# Patient Record
Sex: MALE | Race: BLACK OR AFRICAN AMERICAN | Employment: OTHER | URBAN - METROPOLITAN AREA
[De-identification: names, ages, dates, MRNs, and addresses within clinical notes are randomized per-mention and may not be internally consistent; named-entity substitution may affect disease eponyms.]

---

## 2017-07-14 ENCOUNTER — HOSPITAL ENCOUNTER (OUTPATIENT)
Dept: MRI IMAGING | Age: 72
Discharge: HOME | End: 2017-07-14
Attending: UROLOGY
Payer: MEDICARE

## 2017-07-14 DIAGNOSIS — N28.9 RENAL DISEASE: ICD-10-CM

## 2017-07-14 LAB — CREAT BLD-MCNC: 2 MG/DL (ref 0.6–1.3)

## 2017-07-14 PROCEDURE — 74183 MRI ABD W/O CNTR FLWD CNTR: CPT

## 2017-07-14 PROCEDURE — 74011250636 HC RX REV CODE- 250/636: Performed by: UROLOGY

## 2017-07-14 PROCEDURE — 82565 ASSAY OF CREATININE: CPT

## 2017-07-14 PROCEDURE — A9577 INJ MULTIHANCE: HCPCS | Performed by: UROLOGY

## 2017-07-14 RX ADMIN — GADOBENATE DIMEGLUMINE 20 ML: 529 INJECTION, SOLUTION INTRAVENOUS at 11:30

## 2018-07-16 ENCOUNTER — HOSPITAL ENCOUNTER (OUTPATIENT)
Dept: MRI IMAGING | Age: 73
Discharge: HOME | End: 2018-07-16
Attending: UROLOGY
Payer: MEDICARE

## 2018-07-16 DIAGNOSIS — N28.89 RENAL MASS: ICD-10-CM

## 2018-07-16 LAB — CREAT BLD-MCNC: 1.8 MG/DL (ref 0.6–1.3)

## 2018-07-16 PROCEDURE — 74011250636 HC RX REV CODE- 250/636: Performed by: UROLOGY

## 2018-07-16 PROCEDURE — 74183 MRI ABD W/O CNTR FLWD CNTR: CPT

## 2018-07-16 PROCEDURE — 82565 ASSAY OF CREATININE: CPT

## 2018-07-16 PROCEDURE — A9585 GADOBUTROL INJECTION: HCPCS | Performed by: UROLOGY

## 2018-07-16 RX ADMIN — GADOBUTROL 10 ML: 604.72 INJECTION INTRAVENOUS at 10:16

## 2022-04-18 ENCOUNTER — HOSPITAL ENCOUNTER (OUTPATIENT)
Dept: GENERAL RADIOLOGY | Age: 77
Discharge: HOME | End: 2022-04-18
Payer: MEDICARE

## 2022-04-18 ENCOUNTER — TRANSCRIBE ORDER (OUTPATIENT)
Dept: REGISTRATION | Age: 77
End: 2022-04-18

## 2022-04-18 DIAGNOSIS — M54.2 CERVICAL PAIN: Primary | ICD-10-CM

## 2022-04-18 DIAGNOSIS — M54.2 CERVICAL PAIN: ICD-10-CM

## 2022-04-18 PROCEDURE — 72050 X-RAY EXAM NECK SPINE 4/5VWS: CPT

## 2022-04-18 PROCEDURE — 72052 X-RAY EXAM NECK SPINE 6/>VWS: CPT

## 2022-04-25 ENCOUNTER — TRANSCRIBE ORDER (OUTPATIENT)
Dept: SCHEDULING | Age: 77
End: 2022-04-25

## 2022-04-25 DIAGNOSIS — M54.12 BRACHIAL NEURITIS: Primary | ICD-10-CM

## 2022-06-02 ENCOUNTER — OFFICE VISIT (OUTPATIENT)
Dept: ORTHOPEDIC SURGERY | Age: 77
End: 2022-06-02
Payer: MEDICARE

## 2022-06-02 VITALS — BODY MASS INDEX: 30.2 KG/M2 | WEIGHT: 223 LBS | HEIGHT: 72 IN

## 2022-06-02 DIAGNOSIS — M25.562 CHRONIC PAIN OF LEFT KNEE: Primary | ICD-10-CM

## 2022-06-02 DIAGNOSIS — M25.462 EFFUSION, LEFT KNEE: ICD-10-CM

## 2022-06-02 DIAGNOSIS — G89.29 CHRONIC PAIN OF LEFT KNEE: Primary | ICD-10-CM

## 2022-06-02 DIAGNOSIS — M17.11 PRIMARY OSTEOARTHRITIS OF RIGHT KNEE: ICD-10-CM

## 2022-06-02 PROCEDURE — 99213 OFFICE O/P EST LOW 20 MIN: CPT | Performed by: ORTHOPAEDIC SURGERY

## 2022-06-02 PROCEDURE — 3017F COLORECTAL CA SCREEN DOC REV: CPT | Performed by: ORTHOPAEDIC SURGERY

## 2022-06-02 PROCEDURE — 20610 DRAIN/INJ JOINT/BURSA W/O US: CPT | Performed by: ORTHOPAEDIC SURGERY

## 2022-06-02 PROCEDURE — G8427 DOCREV CUR MEDS BY ELIG CLIN: HCPCS | Performed by: ORTHOPAEDIC SURGERY

## 2022-06-02 PROCEDURE — 1123F ACP DISCUSS/DSCN MKR DOCD: CPT | Performed by: ORTHOPAEDIC SURGERY

## 2022-06-02 PROCEDURE — G8432 DEP SCR NOT DOC, RNG: HCPCS | Performed by: ORTHOPAEDIC SURGERY

## 2022-06-02 PROCEDURE — G8756 NO BP MEASURE DOC: HCPCS | Performed by: ORTHOPAEDIC SURGERY

## 2022-06-02 PROCEDURE — G8536 NO DOC ELDER MAL SCRN: HCPCS | Performed by: ORTHOPAEDIC SURGERY

## 2022-06-02 PROCEDURE — 1101F PT FALLS ASSESS-DOCD LE1/YR: CPT | Performed by: ORTHOPAEDIC SURGERY

## 2022-06-02 PROCEDURE — G8419 CALC BMI OUT NRM PARAM NOF/U: HCPCS | Performed by: ORTHOPAEDIC SURGERY

## 2022-06-02 RX ORDER — TRIAMCINOLONE ACETONIDE 40 MG/ML
40 INJECTION, SUSPENSION INTRA-ARTICULAR; INTRAMUSCULAR ONCE
Status: COMPLETED | OUTPATIENT
Start: 2022-06-02 | End: 2022-06-02

## 2022-06-02 RX ORDER — LIDOCAINE HYDROCHLORIDE 10 MG/ML
2 INJECTION INFILTRATION; PERINEURAL ONCE
Status: COMPLETED | OUTPATIENT
Start: 2022-06-02 | End: 2022-06-02

## 2022-06-02 RX ADMIN — LIDOCAINE HYDROCHLORIDE 2 ML: 10 INJECTION INFILTRATION; PERINEURAL at 10:32

## 2022-06-02 RX ADMIN — TRIAMCINOLONE ACETONIDE 40 MG: 40 INJECTION, SUSPENSION INTRA-ARTICULAR; INTRAMUSCULAR at 10:32

## 2022-06-02 NOTE — PROGRESS NOTES
Maribel Devlin (: 1945) is a 78 y.o. male, patient, here for evaluation of the following chief complaint(s):  Knee Pain (left knee swelling and pain for month, worsening in the last week, no injury or fall reported )       HPI:    Maribel Devlin (: 1945) is a 78 y.o. male, patient, here for evaluation of the following chief complaint(s):  Knee Pain (left knee swelling and pain for month, worsening in the last week, no injury or fall reported )       HPI:    Left knee pain. Short duration. Just swelled up. No injury. Allergies   Allergen Reactions    Nsaids (Non-Steroidal Anti-Inflammatory Drug) Other (comments)     Constipation         Current Outpatient Medications   Medication Sig    hydrALAZINE (APRESOLINE) 50 mg tablet Take 50 mg by mouth three (3) times daily.  tamsulosin (FLOMAX) 0.4 mg capsule Take 0.4 mg by mouth daily.  calcium-cholecalciferol, d3, 600-125 mg-unit tab Take  by mouth.  doxycycline (ADOXA) 100 mg tablet Take 100 mg by mouth two (2) times a day.  predniSONE (DELTASONE) 20 mg tablet Take  by mouth daily (with breakfast).  multivitamin (ONE A DAY) tablet Take 1 Tab by mouth daily.  Omeprazole delayed release (PRILOSEC D/R) 20 mg tablet Take 1 Tab by mouth daily.  diltiazem CD (CARDIZEM CD) 240 mg ER capsule Take 1 Cap by mouth daily.  pravastatin (PRAVACHOL) 40 mg tablet Take 1 Tab by mouth daily.  sildenafil citrate (VIAGRA) 100 mg tablet Take 1 Tab by mouth as needed.  omeprazole (PRILOSEC) 20 mg capsule     traZODone (DESYREL) 50 mg tablet Take  by mouth nightly.  sertraline (ZOLOFT) 100 mg tablet Take  by mouth daily.  aspirin 81 mg Tab Take  by mouth daily. No current facility-administered medications for this visit.        Past Medical History:   Diagnosis Date    Chronic kidney disease     Dyspepsia and other specified disorders of function of stomach     Hypercholesterolemia     Hypertension     Ill-defined condition     enlarged prostate    Other ill-defined conditions(799.89)     high cholest.    Psychiatric disorder     PTSD        Past Surgical History:   Procedure Laterality Date    HX CATARACT REMOVAL  2006    HX HERNIA REPAIR      HX ORTHOPAEDIC  2014    elbow surgery       Family History   Problem Relation Age of Onset    Cancer Mother     Cancer Sister         Social History     Socioeconomic History    Marital status:      Spouse name: Not on file    Number of children: Not on file    Years of education: Not on file    Highest education level: Not on file   Occupational History    Not on file   Tobacco Use    Smoking status: Former Smoker     Quit date: 1995     Years since quittin.4    Smokeless tobacco: Never Used   Substance and Sexual Activity    Alcohol use: Yes     Comment: rarely    Drug use: Not on file    Sexual activity: Not on file   Other Topics Concern    Not on file   Social History Narrative    Not on file     Social Determinants of Health     Financial Resource Strain:     Difficulty of Paying Living Expenses: Not on file   Food Insecurity:     Worried About [address] in the Last Year: Not on file    [address] in the Last Year: Not on file   Transportation Needs:     Lack of Transportation (Medical): Not on file    Lack of Transportation (Non-Medical):  Not on file   Physical Activity:     Days of Exercise per Week: Not on file    Minutes of Exercise per Session: Not on file   Stress:     Feeling of Stress : Not on file   Social Connections:     Frequency of Communication with Friends and Family: Not on file    Frequency of Social Gatherings with Friends and Family: Not on file    Attends [employer] Services: Not on file    Active Member of Clubs or Organizations: Not on file    Attends Club or Organization Meetings: Not on file    Marital Status: Not on file   Intimate Partner Violence:     Fear of Current or Ex-Partner: Not on file    Emotionally Abused: Not on file    Physically Abused: Not on file    Sexually Abused: Not on file   Housing Stability:     Unable to Pay for Housing in the Last Year: Not on file    Number of [place] in the Last Year: Not on file    Unstable Housing in the Last Year: Not on file       ROS     Positive for: Musculoskeletal    Last edited by Josselin Lester on 6/2/2022 10:05 AM. (History)            Vitals:  Ht 6' (1.829 m)   Wt 223 lb (101.2 kg)   BMI 30.24 kg/m²    Body mass index is 30.24 kg/m². PHYSICAL EXAM:  On exam today he has a large swollen left knee. Left hip has painless range of motion. Extensor mechanism is intact. IMAGING:  XR Results (most recent):  Results from Appointment encounter on 06/02/22    XR KNEE LT 3 V    Narrative  3 x-ray views of the left knee reveal well-maintained joint space on all 3 views with a large effusion. Her right knee has bone-on-bone medial compartment. ASSESSMENT/PLAN:  1. Chronic pain of left knee  -     XR KNEE LT 3 V; Future  -     triamcinolone acetonide (KENALOG-40) 40 mg/mL injection 40 mg; 40 mg, Intra artICUlar, ONCE, 1 dose, On Thu 6/2/22 at 1100  -     lidocaine (XYLOCAINE) 10 mg/mL (1 %) injection 2 mL; 2 mL, Intra artICUlar, ONCE, 1 dose, On Thu 6/2/22 at 1100  2. Effusion, left knee  -     triamcinolone acetonide (KENALOG-40) 40 mg/mL injection 40 mg; 40 mg, Intra artICUlar, ONCE, 1 dose, On Thu 6/2/22 at 1100  -     lidocaine (XYLOCAINE) 10 mg/mL (1 %) injection 2 mL; 2 mL, Intra artICUlar, ONCE, 1 dose, On Thu 6/2/22 at 1100  3. Primary osteoarthritis of right knee    Aspirated then injected his left knee. Do not know the absolute cause of his effusion. Hopefully will resolve. Definitely not indicated for any surgical intervention on the left knee. Discussed risks/benefits of cortisone injection and patient gave verbal consent.   Under sterile conditions, the left knee was injected with  2cc 1% Lidocaine and 1 cc 40 mg/cc Kenalog intra-articularly, tolerated the procedure well. An electronic signature was used to authenticate this note.   --Bee Olivia MD

## 2022-08-11 ENCOUNTER — OFFICE VISIT (OUTPATIENT)
Dept: ORTHOPEDIC SURGERY | Age: 77
End: 2022-08-11
Payer: MEDICARE

## 2022-08-11 VITALS — BODY MASS INDEX: 30.2 KG/M2 | WEIGHT: 223 LBS | HEIGHT: 72 IN

## 2022-08-11 DIAGNOSIS — M67.362 TRANSIENT SYNOVITIS, LEFT KNEE: Primary | ICD-10-CM

## 2022-08-11 DIAGNOSIS — M25.462 EFFUSION, LEFT KNEE: ICD-10-CM

## 2022-08-11 DIAGNOSIS — M25.562 LEFT KNEE PAIN, UNSPECIFIED CHRONICITY: ICD-10-CM

## 2022-08-11 PROCEDURE — 20610 DRAIN/INJ JOINT/BURSA W/O US: CPT | Performed by: ORTHOPAEDIC SURGERY

## 2022-08-11 RX ORDER — TRIAMCINOLONE ACETONIDE 40 MG/ML
40 INJECTION, SUSPENSION INTRA-ARTICULAR; INTRAMUSCULAR ONCE
Status: COMPLETED | OUTPATIENT
Start: 2022-08-11 | End: 2022-08-11

## 2022-08-11 RX ORDER — LIDOCAINE HYDROCHLORIDE 10 MG/ML
2 INJECTION INFILTRATION; PERINEURAL ONCE
Status: COMPLETED | OUTPATIENT
Start: 2022-08-11 | End: 2022-08-11

## 2022-08-11 RX ADMIN — LIDOCAINE HYDROCHLORIDE 2 ML: 10 INJECTION INFILTRATION; PERINEURAL at 09:18

## 2022-08-11 RX ADMIN — TRIAMCINOLONE ACETONIDE 40 MG: 40 INJECTION, SUSPENSION INTRA-ARTICULAR; INTRAMUSCULAR at 09:18

## 2022-08-11 NOTE — LETTER
8/11/2022    Patient: Indra Gamboa   YOB: 1945   Date of Visit: 8/11/2022     Alexis White MD  [address] [place] [address] [phone]  Via Fax: [phone]    Dear Alexis White MD,      Thank you for referring Mr. Chetan Gallegos to [facility] for evaluation. My notes for this consultation are attached. If you have questions, please do not hesitate to call me. I look forward to following your patient along with you.       Sincerely,    Fazal Moura MD Quality 431: Preventive Care And Screening: Unhealthy Alcohol Use - Screening: Patient screened for unhealthy alcohol use using a single question and scores less than 2 times per year Detail Level: Generalized Quality 130: Documentation Of Current Medications In The Medical Record: Current Medications Documented Quality 226: Preventive Care And Screening: Tobacco Use: Screening And Cessation Intervention: Patient screened for tobacco use and is an ex/non-smoker

## 2022-08-11 NOTE — PROGRESS NOTES
Jo Ann Wang (: 1945) is a 78 y.o. male, patient, here for evaluation of the following chief complaint(s):  Knee Pain (Left knee pain - injected with cortisone 22)       HPI:    Patient presents today large knee effusion. X-rays were not really diagnostic. Have history of having gout 1 time in the past.    Allergies   Allergen Reactions    Nsaids (Non-Steroidal Anti-Inflammatory Drug) Other (comments)     Constipation         Current Outpatient Medications   Medication Sig    hydrALAZINE (APRESOLINE) 50 mg tablet Take 50 mg by mouth three (3) times daily. tamsulosin (FLOMAX) 0.4 mg capsule Take 0.4 mg by mouth daily. calcium-cholecalciferol, d3, 600-125 mg-unit tab Take  by mouth. doxycycline (ADOXA) 100 mg tablet Take 100 mg by mouth two (2) times a day. predniSONE (DELTASONE) 20 mg tablet Take  by mouth daily (with breakfast). multivitamin (ONE A DAY) tablet Take 1 Tab by mouth daily. Omeprazole delayed release (PRILOSEC D/R) 20 mg tablet Take 1 Tab by mouth daily. diltiazem CD (CARDIZEM CD) 240 mg ER capsule Take 1 Cap by mouth daily. pravastatin (PRAVACHOL) 40 mg tablet Take 1 Tab by mouth daily. sildenafil citrate (VIAGRA) 100 mg tablet Take 1 Tab by mouth as needed. omeprazole (PRILOSEC) 20 mg capsule     traZODone (DESYREL) 50 mg tablet Take  by mouth nightly. sertraline (ZOLOFT) 100 mg tablet Take  by mouth daily. aspirin 81 mg Tab Take  by mouth daily. No current facility-administered medications for this visit.        Past Medical History:   Diagnosis Date    Chronic kidney disease     Dyspepsia and other specified disorders of function of stomach     Hypercholesterolemia     Hypertension     Ill-defined condition     enlarged prostate    Other ill-defined conditions(299.89)     high cholest.    Psychiatric disorder     PTSD        Past Surgical History:   Procedure Laterality Date    HX CATARACT REMOVAL      HX HERNIA REPAIR      HX ORTHOPAEDIC  2014    elbow surgery       Family History   Problem Relation Age of Onset    Cancer Mother     Cancer Sister         Social History     Socioeconomic History    Marital status:      Spouse name: Not on file    Number of children: Not on file    Years of education: Not on file    Highest education level: Not on file   Occupational History    Not on file   Tobacco Use    Smoking status: Former     Types: Cigarettes     Quit date: 1995     Years since quittin.6    Smokeless tobacco: Never   Substance and Sexual Activity    Alcohol use: Yes     Comment: rarely    Drug use: Not on file    Sexual activity: Not on file   Other Topics Concern    Not on file   Social History Narrative    Not on file     Social Determinants of Health     Financial Resource Strain: Not on file   Food Insecurity: Not on file   Transportation Needs: Not on file   Physical Activity: Not on file   Stress: Not on file   Social Connections: Not on file   Intimate Partner Violence: Not on file   Housing Stability: Not on file       ROS    Positive for: Musculoskeletal  Last edited by Neena Dietz on 2022  9:14 AM.            Khushi Martinez:  Ht 6' (1.829 m)   Wt 223 lb (101.2 kg)   BMI 30.24 kg/m²    Body mass index is 30.24 kg/m². PHYSICAL EXAM:  Exam today patient is left knee has large effusion. Neutral alignment. Really not that painful. IMAGING:  None    ASSESSMENT/PLAN:  1. Transient synovitis, left knee  -     triamcinolone acetonide (KENALOG-40) 40 mg/mL injection 40 mg; 40 mg, Intra artICUlar, ONCE, 1 dose, On Thu 22 at 1000  -     lidocaine (XYLOCAINE) 10 mg/mL (1 %) injection 2 mL; 2 mL, Intra artICUlar, ONCE, 1 dose, On Thu 22 at 1000  2.  Effusion, left knee  -     triamcinolone acetonide (KENALOG-40) 40 mg/mL injection 40 mg; 40 mg, Intra artICUlar, ONCE, 1 dose, On Thu 22 at 1000  -     lidocaine (XYLOCAINE) 10 mg/mL (1 %) injection 2 mL; 2 mL, Intra artICUlar, ONCE, 1 dose, On Thu 8/11/22 at 1000  3. Left knee pain, unspecified chronicity  -     triamcinolone acetonide (KENALOG-40) 40 mg/mL injection 40 mg; 40 mg, Intra artICUlar, ONCE, 1 dose, On Thu 8/11/22 at 1000  -     lidocaine (XYLOCAINE) 10 mg/mL (1 %) injection 2 mL; 2 mL, Intra artICUlar, ONCE, 1 dose, On Thu 8/11/22 at 1000  Aspirated left knee. Did send the fluid today for crystal analysis. Injected left knee. Patient's primary care doctor could help us with an inflammatory work-up or gout work-up on the patient. Discussed risks/benefits of cortisone injection and patient gave verbal consent. Under sterile conditions, the knee was injected with  2cc 1% Lidocaine and 1 cc 40 mg/cc Kenalog intra-articularly, tolerated the procedure well. An electronic signature was used to authenticate this note.   --Steve Santana MD

## 2022-08-12 DIAGNOSIS — M67.362 TRANSIENT SYNOVITIS, LEFT KNEE: Primary | ICD-10-CM

## 2022-08-13 LAB
BODY FLD TYPE: NORMAL
CRYSTALS FLD MICRO: NORMAL

## 2022-10-13 NOTE — PROGRESS NOTES
ASSESSMENT/PLAN:  Below is the assessment and plan developed based on review of pertinent history, physical exam, labs, studies, and medications. 1. Bilateral shoulder pain, unspecified chronicity  2. Neck pain      No follow-ups on file. In discussion with the patient, we considered the numerus possible diagnoses that could be contributing to their present symptoms. We also deliberated on the extensive management options that must be considered to treat their current condition. We reviewed their accessible prior medical records, diagnostic tests, and current health and employment information. We considered how these symptoms were affecting the patient´s activities of daily living as well as employment and fitness activities. The patient had various questions regarding the possible risks, benefits, complications, morbidity and mortality regarding their diagnosis and treatment options. The patients´ comorbidities were considered, and I advocated that they consider maximizing lifestyle modification through nutrition and exercise to aid in addressing their symptoms. Shared decision making yielded an understanding to move forward with conservation treatment preferences. The patient expressed understanding that if conservative management fails to alleviate the present symptoms they will return to office for re-evaluation and consideration of additional diagnostic tests and potential surgical options. In the interim, we have recommended ice, elevation, and take prescription anti-inflammatory medications along with a physician directed home exercise program. We discussed the risks and common side effects of anti-inflammatory medications and instructed the patient to discontinue the medication and contact us if they experienced any side effects. The patient was encouraged to discuss the possible side effects with their family physician or pharmacist prior to initiating any new medications.     We discussed the fact that many of the recommended treatment options presented are significantly limited by the patient´s social determinants of health. We also reviewed the circumstances surrounding the environment that they live and work which affect a wide range of health risk. We considered the limited access to appropriate educational resources regarding proper nutrition and exercise as well as the economic and social support necessary to maintain health and wellbeing. Given that the patient's symptoms are increasing in frequency and duration we have decided to prescribe physical therapy. We talked about the fact that the goal of physical therapy is for the therapist to assist in developing a program to help return the patient to full strength, function and mobility and decrease pain. We also discussed that the therapist may combine several techniques to help decrease pain. These include but are not limited to stretching, balance exercises, strength training, massage, cold and heat therapy, and electrical stimulation. Although, physical therapy is generally safe, we went over the potential risks to include the worsening of pre-existing conditions, continued pain and no improvement in flexibility, mobility, and strength. We will have the patient follow up after physical therapy to closely monitor their progress. We talked about following up sooner if therapy is not progressing on a weekly basis. After a long discussion regarding treatment options, we have decided to prescribe an oral medication. We discussed the risks and common side effects of the medication and instructed the patient to discontinue the medication and contact us if they experienced any side effects. We also encouraged the patient to discuss the possible side effects with their family physician or pharmacist prior to initiating any new medications.      Given that the patient's symptoms are increasing in frequency and duration, we have decided to evaluate the etiology of the pain and loss of function with an MRI. We discussed the risks of an MRI which include, but are not limited to the enclosed space, noisy environment, magnetic effect on implanted metal. We also talked about the fact that MRI is also contraindicated in the presence of internal metallic objects such as bullets or shrapnel, as well as surgical clips, pins, plates, screws, metal sutures, or wire mesh. We talked about the fact that MRI does not use radiation, but it may be contrindicated if the patient has implanted pacemakers, intracranial aneurysm clips, cochlear implants, certain prosthetic devices, implanted drug infusion pumps, neurostimulators, bone-growth stimulators, certain intrauterine contraceptive devices; or any other type of iron-based metal implants. We discussed the fact that if you are pregnant or suspect that you may be pregnant, you should notify your physician and consult with your primary care or obstetrician before having an MRI. Although rare, we talked about the fact that if contrast dye is used, there is a risk for allergic reaction to the dye. Patients who are allergic to or sensitive to medications, contrast dye, iodine, or shellfish should notify the radiologist or technologist prior to the administration of dye. MRI contrast may also influence other conditions such as allergies, asthma, anemia, hypotension (low blood pressure), and sickle cell disease. The patient has expressed understanding of these risks and I will see the patient back after the MRI to discuss the findings as well as the treatment options. We talked about the fact that we felt as though his biggest problem today was his cervical spine and possible radiculopathy to both shoulders. We will proceed with an MRI of his cervical spine as well as some start some physical therapy and a Medrol Dosepak.   We will refer him to see one of our spinal specialist.        SUBJECTIVE/OBJECTIVE:  Amrita Dominique (: 1945) is a 78 y.o. male, patient,here for evaluation of the No chief complaint on file. .   Patient presents for evaluation for neck and bilateral shoulder pain right worse than left. Patient states he has had intermittent pain over the years however it has worsened in the past couple weeks. He has been taking Tylenol with mild relief. He denies any specific injury or accidents. Denies any radiating symptoms down either arm. PHYSICAL EXAM:    Upon examination of the right shoulder, the patient sits with the scapula protracted and depressed. They are tender to palpation over the anterior supraspinatus and proximal biceps. There is mild palpable crepitus in the subacromial space with ranging. The patient has full range of motion, but discomfort with above shoulder range of motion. The patient has discomfort with impingement maneuvers and Whipple testing. The shoulder is stable on exam. They have 5/5 strength, and are neurovascularly intact distally. There is no erythema, warmth or skin lesions present. Upon examination of the left shoulder, the patient sits with the scapula protracted and depressed. They are tender to palpation over the anterior supraspinatus and proximal biceps. There is mild palpable crepitus in the subacromial space with ranging. The patient has full range of motion, but discomfort with above shoulder range of motion. The patient has discomfort with impingement maneuvers and Whipple testing. The shoulder is stable on exam. They have 5/5 strength, and are neurovascularly intact distally. There is no erythema, warmth or skin lesions present. IMAGING:    X-rays performed today in the office 6 views of bilateral shoulders demonstrate no obvious fractures or dislocations. X-rays performed today in the office 2 views of the cervical spine demonstrate significant degenerative changes predominantly at C3-4 and C6-7.     Allergies   Allergen Reactions    Nsaids (Non-Steroidal Anti-Inflammatory Drug) Other (comments)     Constipation         Current Outpatient Medications   Medication Sig    hydrALAZINE (APRESOLINE) 50 mg tablet Take 50 mg by mouth three (3) times daily. tamsulosin (FLOMAX) 0.4 mg capsule Take 0.4 mg by mouth daily. calcium-cholecalciferol, d3, 600-125 mg-unit tab Take  by mouth. doxycycline (ADOXA) 100 mg tablet Take 100 mg by mouth two (2) times a day. predniSONE (DELTASONE) 20 mg tablet Take  by mouth daily (with breakfast). multivitamin (ONE A DAY) tablet Take 1 Tab by mouth daily. Omeprazole delayed release (PRILOSEC D/R) 20 mg tablet Take 1 Tab by mouth daily. diltiazem CD (CARDIZEM CD) 240 mg ER capsule Take 1 Cap by mouth daily. pravastatin (PRAVACHOL) 40 mg tablet Take 1 Tab by mouth daily. sildenafil citrate (VIAGRA) 100 mg tablet Take 1 Tab by mouth as needed. omeprazole (PRILOSEC) 20 mg capsule     traZODone (DESYREL) 50 mg tablet Take  by mouth nightly. sertraline (ZOLOFT) 100 mg tablet Take  by mouth daily. aspirin 81 mg Tab Take  by mouth daily. No current facility-administered medications for this visit.        Past Medical History:   Diagnosis Date    Chronic kidney disease     Dyspepsia and other specified disorders of function of stomach     Hypercholesterolemia     Hypertension     Ill-defined condition     enlarged prostate    Other ill-defined conditions(799.89)     high cholest.    Psychiatric disorder     PTSD       Past Surgical History:   Procedure Laterality Date    HX CATARACT REMOVAL  2006    HX HERNIA REPAIR      HX ORTHOPAEDIC  2014    elbow surgery       Family History   Problem Relation Age of Onset    Cancer Mother     Cancer Sister        Social History     Socioeconomic History    Marital status:      Spouse name: Not on file    Number of children: Not on file    Years of education: Not on file    Highest education level: Not on file   Occupational History    Not on file   Tobacco Use Smoking status: Former     Types: Cigarettes     Quit date: 1995     Years since quittin.8    Smokeless tobacco: Never   Substance and Sexual Activity    Alcohol use: Yes     Comment: rarely    Drug use: Not on file    Sexual activity: Not on file   Other Topics Concern    Not on file   Social History Narrative    Not on file     Social Determinants of Health     Financial Resource Strain: Not on file   Food Insecurity: Not on file   Transportation Needs: Not on file   Physical Activity: Not on file   Stress: Not on file   Social Connections: Not on file   Intimate Partner Violence: Not on file   Housing Stability: Not on file       Review of Systems    No flowsheet data found. Vitals: There were no vitals taken for this visit. There is no height or weight on file to calculate BMI. An electronic signature was used to authenticate this note.   -- Wolfgang Duarte MD

## 2022-10-14 ENCOUNTER — OFFICE VISIT (OUTPATIENT)
Dept: ORTHOPEDIC SURGERY | Age: 77
End: 2022-10-14
Payer: MEDICARE

## 2022-10-14 VITALS — WEIGHT: 223 LBS | HEIGHT: 72 IN | BODY MASS INDEX: 30.2 KG/M2

## 2022-10-14 DIAGNOSIS — M54.12 CERVICAL RADICULOPATHY: ICD-10-CM

## 2022-10-14 DIAGNOSIS — M25.512 BILATERAL SHOULDER PAIN, UNSPECIFIED CHRONICITY: Primary | ICD-10-CM

## 2022-10-14 DIAGNOSIS — M54.2 NECK PAIN: ICD-10-CM

## 2022-10-14 DIAGNOSIS — M25.511 BILATERAL SHOULDER PAIN, UNSPECIFIED CHRONICITY: Primary | ICD-10-CM

## 2022-10-14 DIAGNOSIS — M50.30 DEGENERATIVE DISC DISEASE, CERVICAL: ICD-10-CM

## 2022-10-14 PROCEDURE — G8427 DOCREV CUR MEDS BY ELIG CLIN: HCPCS | Performed by: ORTHOPAEDIC SURGERY

## 2022-10-14 PROCEDURE — 3017F COLORECTAL CA SCREEN DOC REV: CPT | Performed by: ORTHOPAEDIC SURGERY

## 2022-10-14 PROCEDURE — G8536 NO DOC ELDER MAL SCRN: HCPCS | Performed by: ORTHOPAEDIC SURGERY

## 2022-10-14 PROCEDURE — 1101F PT FALLS ASSESS-DOCD LE1/YR: CPT | Performed by: ORTHOPAEDIC SURGERY

## 2022-10-14 PROCEDURE — 99214 OFFICE O/P EST MOD 30 MIN: CPT | Performed by: ORTHOPAEDIC SURGERY

## 2022-10-14 PROCEDURE — G8756 NO BP MEASURE DOC: HCPCS | Performed by: ORTHOPAEDIC SURGERY

## 2022-10-14 PROCEDURE — G8417 CALC BMI ABV UP PARAM F/U: HCPCS | Performed by: ORTHOPAEDIC SURGERY

## 2022-10-14 PROCEDURE — G8432 DEP SCR NOT DOC, RNG: HCPCS | Performed by: ORTHOPAEDIC SURGERY

## 2022-10-14 PROCEDURE — 1123F ACP DISCUSS/DSCN MKR DOCD: CPT | Performed by: ORTHOPAEDIC SURGERY

## 2022-10-14 RX ORDER — METHYLPREDNISOLONE 4 MG/1
4 TABLET ORAL
Qty: 1 DOSE PACK | Refills: 0 | Status: SHIPPED | OUTPATIENT
Start: 2022-10-14

## 2022-10-24 ENCOUNTER — OFFICE VISIT (OUTPATIENT)
Dept: ORTHOPEDIC SURGERY | Age: 77
End: 2022-10-24
Payer: MEDICARE

## 2022-10-24 DIAGNOSIS — M25.511 BILATERAL SHOULDER PAIN, UNSPECIFIED CHRONICITY: Primary | ICD-10-CM

## 2022-10-24 DIAGNOSIS — M54.12 CERVICAL RADICULOPATHY: ICD-10-CM

## 2022-10-24 DIAGNOSIS — M54.2 NECK PAIN: ICD-10-CM

## 2022-10-24 DIAGNOSIS — M25.512 BILATERAL SHOULDER PAIN, UNSPECIFIED CHRONICITY: Primary | ICD-10-CM

## 2022-10-24 PROCEDURE — 97161 PT EVAL LOW COMPLEX 20 MIN: CPT | Performed by: PHYSICAL THERAPIST

## 2022-10-24 PROCEDURE — 97140 MANUAL THERAPY 1/> REGIONS: CPT | Performed by: PHYSICAL THERAPIST

## 2022-10-24 NOTE — PROGRESS NOTES
CERVICAL SPINE EVALUATION  Patient Name: Zina Mcdaniel  WPTM:  : 1945  [x]  Patient  Verified  Payor: Tomi Mack / Plan: [address] [place] [address] HMO / Product Type: Managed Care Medicare /    Total Treatment Time (min): 40  Total Timed Codes (min): 40  1:1 Treatment Time ( [address] only): 40   Referring Physician:   Jose Mccracken MD       1. Bilateral shoulder pain, unspecified chronicity  2. Neck pain  3. Cervical radiculopathy      SUBJECTIVE  Patient is a 79-year-old male referred to physical therapy by Dr. Deana Issa for neck/shoulder pain. He reports symptoms began gradually about 2 months ago, has been staying the same since. He has had x-rays which show significant degeneration at C3-4 and C6-7. He is scheduled to have an MRI on . Pain located mainly along the right side of his neck into the right shoulder, occasionally on the left side. No radiating pain down the arms, numbness/tingling or weakness. Describes pain as sharp. He is taking Tylenol for relief, has not been using heat or ice. He denies any difficulty with ADLs. He is active with his [employer]. His goals for PT are to decrease his pain. Past Medical History:   Diagnosis Date    Chronic kidney disease     Dyspepsia and other specified disorders of function of stomach     Hypercholesterolemia     Hypertension     Ill-defined condition     enlarged prostate    Other ill-defined conditions(799.89)     high cholest.    Psychiatric disorder     PTSD        OBJECTIVE  Observations: Decreased cervical lordosis noted. He holds a forward head and rounded shoulder posture    Palpation: Tenderness to right cervical paraspinals, upper trap, levator    ROM: Able to flex chin to chest with some discomfort. Extension is limited 75% and painful. Left rotation limited to 62 degrees, right 52 degrees and painful. Bilateral side bend limited to 12 degrees with some discomfort.   Tightness with bilateral passive rotation, pain with endrange right rotation. There is also pain with right side bend. No restriction with AA rotation but some pain reported to the right. Forward flexion limited to 125 degrees, some pain on the right. Abduction 115 degrees with some pain. External rotation to T3 compared to T6 on the left. Internal rotation symmetrical to T12 with some tightness reported    Strength: Grossly 5/5 throughout bilateral upper extremities    Sensation: Intact    Joint mobility: Pain reported with PA mobilization to C5-C7    Special tests: Negative Spurling's. Relief reported with traction    Treatment:  Performed initial evaluation (20 minutes)    Manual therapy (15 minutes): Cervical traction with suboccipital release, soft tissue work to right cervical paraspinals, upper trap, rotation stretching, manual upper trap stretching     Therapeutic exercise (5 minutes): Provided and educated patient in home exercise program for cervical spine mobility and flexibility. We discussed patient goals and collaborated upon a plan of care        ASSESSMENT    Patient presents with impairments related to posture, cervical spine and shoulder range of motion, strength, and impaired ability to participate in desired activity second to primarily right-sided neck/shoulder pain. Presentation consistent with facet dysfunction/OA. He will benefit from outpatient physical therapy services to address above limitations and maximize function. Short-term Goals (1 week)  1. Patient will demonstrate independence with home exercise program to enhance recovery. Long-term Goals (4-6 weeks)  1. Patient will report at least 25% decrease in pain to maximize activity tolerance. 2. Patient will demonstrate full cervical rotation without increase in symptoms from baseline level. 3. Patient will demonstrate improved shoulder elevation without upper trap hiking or pain     Frequency: 1-2x per week. 20 visits.     Interventions to include but are not limited to joint mobilizations, myofascial release, soft tissue mobilization, therapeutic exercise, neuromuscular reeducation, dry needling, taping, and modalities as indicated. Jigna Mike, PT 10/24/2022  9:40 AM    The referring physician has reviewed and approved this evaluation and plan of care as noted by the electronic signature attached to note.

## 2022-12-27 ENCOUNTER — OFFICE VISIT (OUTPATIENT)
Dept: ORTHOPEDIC SURGERY | Age: 77
End: 2022-12-27
Payer: MEDICARE

## 2022-12-27 VITALS — WEIGHT: 218 LBS | BODY MASS INDEX: 29.53 KG/M2 | HEIGHT: 72 IN

## 2022-12-27 DIAGNOSIS — M25.462 EFFUSION, LEFT KNEE: Primary | ICD-10-CM

## 2022-12-27 DIAGNOSIS — M17.12 PRIMARY OSTEOARTHRITIS OF LEFT KNEE: ICD-10-CM

## 2022-12-27 PROCEDURE — 20610 DRAIN/INJ JOINT/BURSA W/O US: CPT | Performed by: ORTHOPAEDIC SURGERY

## 2022-12-27 PROCEDURE — 99024 POSTOP FOLLOW-UP VISIT: CPT | Performed by: ORTHOPAEDIC SURGERY

## 2022-12-27 RX ORDER — TRIAMCINOLONE ACETONIDE 40 MG/ML
40 INJECTION, SUSPENSION INTRA-ARTICULAR; INTRAMUSCULAR ONCE
Status: COMPLETED | OUTPATIENT
Start: 2022-12-27 | End: 2022-12-27

## 2022-12-27 RX ADMIN — TRIAMCINOLONE ACETONIDE 40 MG: 40 INJECTION, SUSPENSION INTRA-ARTICULAR; INTRAMUSCULAR at 14:56

## 2022-12-27 NOTE — PROGRESS NOTES
Rena Banerjee (: 1945) is a 78 y.o. male, patient, here for evaluation of the following chief complaint(s):  Knee Pain (Left knee follow up) and Joint Or Tendon Injection (Cortisone injection - last injected in 2022)       HPI:    Requesting left knee injection. Allergies   Allergen Reactions    Nsaids (Non-Steroidal Anti-Inflammatory Drug) Other (comments)     Constipation         Current Outpatient Medications   Medication Sig    methylPREDNISolone (MEDROL DOSEPACK) 4 mg tablet Take 1 Tablet by mouth Specific Days and Specific Times. Used as directed    hydrALAZINE (APRESOLINE) 50 mg tablet Take 50 mg by mouth three (3) times daily. tamsulosin (FLOMAX) 0.4 mg capsule Take 0.4 mg by mouth daily. calcium-cholecalciferol, d3, 600-125 mg-unit tab Take  by mouth. doxycycline (ADOXA) 100 mg tablet Take 100 mg by mouth two (2) times a day. predniSONE (DELTASONE) 20 mg tablet Take  by mouth daily (with breakfast). multivitamin (ONE A DAY) tablet Take 1 Tab by mouth daily. Omeprazole delayed release (PRILOSEC D/R) 20 mg tablet Take 1 Tab by mouth daily. diltiazem CD (CARDIZEM CD) 240 mg ER capsule Take 1 Cap by mouth daily. pravastatin (PRAVACHOL) 40 mg tablet Take 1 Tab by mouth daily. sildenafil citrate (VIAGRA) 100 mg tablet Take 1 Tab by mouth as needed. omeprazole (PRILOSEC) 20 mg capsule     traZODone (DESYREL) 50 mg tablet Take  by mouth nightly. sertraline (ZOLOFT) 100 mg tablet Take  by mouth daily. aspirin 81 mg Tab Take  by mouth daily.      Current Facility-Administered Medications   Medication    triamcinolone acetonide (KENALOG-40) 40 mg/mL injection 40 mg       Past Medical History:   Diagnosis Date    Chronic kidney disease     Dyspepsia and other specified disorders of function of stomach     Hypercholesterolemia     Hypertension     Ill-defined condition     enlarged prostate    Other ill-defined conditions(999.89)     high cholest.    Psychiatric disorder     PTSD        Past Surgical History:   Procedure Laterality Date    HX CATARACT REMOVAL  2006    HX HERNIA REPAIR      HX ORTHOPAEDIC  2014    elbow surgery       Family History   Problem Relation Age of Onset    Cancer Mother     Cancer Sister         Social History     Socioeconomic History    Marital status:      Spouse name: Not on file    Number of children: Not on file    Years of education: Not on file    Highest education level: Not on file   Occupational History    Not on file   Tobacco Use    Smoking status: Former     Types: Cigarettes     Quit date: 1995     Years since quittin.0    Smokeless tobacco: Never   Substance and Sexual Activity    Alcohol use: Yes     Comment: rarely    Drug use: Not on file    Sexual activity: Not on file   Other Topics Concern    Not on file   Social History Narrative    Not on file     Social Determinants of Health     Financial Resource Strain: Not on file   Food Insecurity: Not on file   Transportation Needs: Not on file   Physical Activity: Not on file   Stress: Not on file   Social Connections: Not on file   Intimate Partner Violence: Not on file   Housing Stability: Not on file       ROS    Positive for: Musculoskeletal  Last edited by Jerod Cabrera on 2022  2:31 PM.            Vitals:  Ht 6' (1.829 m)   Wt 218 lb (98.9 kg)   BMI 29.57 kg/m²    Body mass index is 29.57 kg/m². ASSESSMENT/PLAN:  1. Effusion, left knee  -     triamcinolone acetonide (KENALOG-40) 40 mg/mL injection 40 mg; 40 mg, Intra artICUlar, ONCE, 1 dose, On 22 at 1500  2. Primary osteoarthritis of left knee  -     triamcinolone acetonide (KENALOG-40) 40 mg/mL injection 40 mg; 40 mg, Intra artICUlar, ONCE, 1 dose, On 22 at 1500  Discussed risks/benefits of cortisone injection and patient gave verbal consent. Under sterile conditions, the knee was injected with    1 cc 40 mg/cc Kenalog intra-articularly, tolerated the procedure well. An electronic signature was used to authenticate this note.   --Ebony Busch MD

## 2023-01-12 ENCOUNTER — OFFICE VISIT (OUTPATIENT)
Dept: ORTHOPEDIC SURGERY | Age: 78
End: 2023-01-12
Payer: MEDICARE

## 2023-01-12 VITALS — BODY MASS INDEX: 29.53 KG/M2 | HEIGHT: 72 IN | WEIGHT: 218 LBS

## 2023-01-12 DIAGNOSIS — M54.12 CERVICAL RADICULOPATHY: ICD-10-CM

## 2023-01-12 DIAGNOSIS — M48.02 CERVICAL STENOSIS OF SPINAL CANAL: ICD-10-CM

## 2023-01-12 DIAGNOSIS — M54.2 NECK PAIN: Primary | ICD-10-CM

## 2023-01-12 DIAGNOSIS — M50.90 CERVICAL DISC DISEASE: ICD-10-CM

## 2023-01-12 NOTE — PROGRESS NOTES
Arleen Moreno (: 1945) is a 78 y.o. male, patient, here for evaluation of the following chief complaint(s):  Neck Pain (Cervical MRI 10/14/22)       ASSESSMENT/PLAN:    Below is the assessment and plan developed based on review of pertinent history, physical exam, labs, studies, and medications. We reviewed the MRI of his cervical spine. He has developed severe spondylosis at C3-4 C4-5 C5-6 and C6-7. 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 He is having worsening neck pain as well as shoulder pain. He is also having some increased weakness in the hands. He has tried conservative treatment over the last 6 months without relief. I think is a candidate for an ACDF from C3-C7 for decompression stabilization. Risk and benefits were discussed with him and his wife. Procedure: ACDF C3-C7      The risks and benefits were discussed at length with the patient and the patient has elected to proceed. Indications for surgery include failed conservative treatment. Alternative treatments, risks and the perioperative course were discussed with the patient. All questions were answered. The risks and benefits of the procedure were explained. Benefits include definitive diagnosis, relief of pain, elimination of deformity and improved function. Risks of surgery including bleeding, infection, weakness, numbness, CSF leak, failure to improve symptoms, exacerbation of medical co-morbidities and even death were discussed with the patient. 1. Neck pain  -     XR SPINE CERV PA LAT ODONT 3 V MAX; Future  2. Cervical radiculopathy  3. Cervical stenosis of spinal canal  4. Cervical disc disease      No follow-ups on file. SUBJECTIVE/OBJECTIVE:  Arleen Moreno (: 1945) is a 78 y.o. male. No flowsheet data found. He comes in today on referral from sports medicine for neck pain and bilateral shoulder pain as well as hand weakness.   He had chronic neck pain has been worsening over the last year. He is try conservative treatment including physical therapy for the neck and shoulders without long-term relief. He has an MRI for review. He has no ataxia but does still have some weakness in the hands and shoulders. He denies any bowel bladder difficulties    Imaging:    XR Results (most recent):  Results from Appointment encounter on 01/12/23    XR SPINE CERV PA LAT ODONT 3 V MAX    Narrative  AP and lateral and flexion-extension of the cervical spine reviewed today. Multilevel cervical spondylosis noted. Most significant at C3-4 and C5-6. No fractures or lytic lesions       Reviewed the cervical MRI. Spondylosis is noted throughout the cervical spine from C3-C7. Cervical stenosis is noted at those levels as well. Moderate to severe at C6-7 and C5-6. Moderate C3-4          MRI Results (most recent): MRI Results (most recent):  Results from Appointment encounter on 11/01/22    MRI CERV SPINE WO CONT    Narrative  EXAM: MRI CERV SPINE WO CONT    INDICATION: NECK PAIN. Clinical diagnosis of cervical radiculopathy. COMPARISON: Cervical spine views on 10/14/2022 and 4/18/2022. TECHNIQUE: MR imaging of the cervical spine was performed using the following  sequences: sagittal T1, T2, STIR;  axial T2, T1.    CONTRAST:  None. FINDINGS:    2 mm posterior subluxation of C3 on C4 is new versus more conspicuous in this  position. No other subluxation. Vertebral body heights are maintained. Degenerative bone marrow edema is mild in the C3 and C6 vertebral bodies. Moderate facet arthrosis at C4-C5. Multilevel disc desiccation is moderate at  C3-4 and C6-7. No discitis. The craniocervical junction is intact. Ventral cord indentation at C3-4, C5-6,  and C6-7. No cord contusion or syrinx. Mild atrophy of the paraspinal musculature. C2-C3: No herniation or stenosis. C3-C4: Asymmetric left posterior disc osteophyte complex. Moderate central  spinal canal stenosis. Moderate left and mild right foraminal stenosis. C4-C5: Facet arthrosis. Mild left foraminal stenosis. C5-C6: Lobulated posterior disc osteophyte complex. Moderate central spinal  canal stenosis. Mild bilateral foraminal stenosis. C6-C7: Lobulated posterior disc osteophyte complex. Moderate central spinal  canal stenosis. Severe right and moderate left foraminal stenosis. C7-T1: No herniation or stenosis. Impression  1. Moderate central spinal canal stenosis at C3-C4, C5-C6, and C6-C7.  2. Severe right foraminal stenosis at C6-C7. 3. Ventral cord indentation without cord compression or cord contusion. Allergies   Allergen Reactions    Nsaids (Non-Steroidal Anti-Inflammatory Drug) Other (comments)     Constipation         Current Outpatient Medications   Medication Sig    methylPREDNISolone (MEDROL DOSEPACK) 4 mg tablet Take 1 Tablet by mouth Specific Days and Specific Times. Used as directed    hydrALAZINE (APRESOLINE) 50 mg tablet Take 50 mg by mouth three (3) times daily. tamsulosin (FLOMAX) 0.4 mg capsule Take 0.4 mg by mouth daily. calcium-cholecalciferol, d3, 600-125 mg-unit tab Take  by mouth. doxycycline (ADOXA) 100 mg tablet Take 100 mg by mouth two (2) times a day. predniSONE (DELTASONE) 20 mg tablet Take  by mouth daily (with breakfast). multivitamin (ONE A DAY) tablet Take 1 Tab by mouth daily. Omeprazole delayed release (PRILOSEC D/R) 20 mg tablet Take 1 Tab by mouth daily. diltiazem CD (CARDIZEM CD) 240 mg ER capsule Take 1 Cap by mouth daily. pravastatin (PRAVACHOL) 40 mg tablet Take 1 Tab by mouth daily. sildenafil citrate (VIAGRA) 100 mg tablet Take 1 Tab by mouth as needed. omeprazole (PRILOSEC) 20 mg capsule     traZODone (DESYREL) 50 mg tablet Take  by mouth nightly. sertraline (ZOLOFT) 100 mg tablet Take  by mouth daily. aspirin 81 mg Tab Take  by mouth daily. No current facility-administered medications for this visit. Past Medical History:   Diagnosis Date    Chronic kidney disease     Dyspepsia and other specified disorders of function of stomach     Hypercholesterolemia     Hypertension     Ill-defined condition     enlarged prostate    Other ill-defined conditions(799.89)     high cholest.    Psychiatric disorder     PTSD        Past Surgical History:   Procedure Laterality Date    HX CATARACT REMOVAL  2006    HX HERNIA REPAIR      HX ORTHOPAEDIC  2014    elbow surgery       Family History   Problem Relation Age of Onset    Cancer Mother     Cancer Sister         Social History     Tobacco Use    Smoking status: Former     Types: Cigarettes     Quit date: 1995     Years since quittin.0    Smokeless tobacco: Never   Substance Use Topics    Alcohol use: Yes     Comment: rarely        Review of Systems       Vitals:  Ht 6' (1.829 m)   Wt 218 lb (98.9 kg)   BMI 29.57 kg/m²    Body mass index is 29.57 kg/m². Ortho Exam       Alert and Tarzan  x 3    Normal gait and station; normal posture    No assistive devices today. Lumbar spine:  Examination of the lumbar spine demonstrates no tenderness on palpation, no pain, no swelling or edema, with normal lumbar range of motion. Thoracic spine: Examination of the thoracic spine demonstrates no tenderness on palpation, no pain, no swelling or edema. Normal sensation and range of motion. Cervical spine:     Examination of the cervical spine demonstrates on inspection: No obvious cutaneous markings mostly in the cervical lordosis. No step-offs noted. .    On palpation: General tenderness on palpation cervical thoracic junction and across both shoulders. Range of motion: Normal range of motion. Increased pain with extension and rotation bilaterally. Decreased rotation bilaterally to 30 degrees.     Motor examination:  Right deltoid 4/5,  left deltoid 4/5, right bicep 5/5, left bicep 4/5, right wrist extensor 5/5, left wrist extensor 4/5, right triceps 5/5, left triceps 5/5, right intrinsics 5/5, left intrinsics    Sensory examination: Reveals no gross deficits    Reflexes: Left bicep 2/2, left triceps 2/2, right biceps 2/2, right triceps 2/2    Functional testing: Spurling's exam is positive bilaterally; upper limb tension test is positive bilaterally    Cr's signs positive on the left. An electronic signature was used to authenticate this note.   -- Elicia Seals MD

## 2023-01-12 NOTE — PROGRESS NOTES
1. Have you been to the ER, urgent care clinic since your last visit? Hospitalized since your last visit? No    2. Have you seen or consulted any other health care providers outside of the [address] [place] [address] since your last visit? Include any pap smears or colon screening.  No    Chief Complaint   Patient presents with    Neck Pain     Cervical MRI 10/14/22

## 2023-01-12 NOTE — LETTER
1/12/2023    Patient: Jessy Doll   YOB: 1945   Date of Visit: 1/12/2023     Venus Hendricks MD  [address] [address]  Via Fax: [phone]    Dear Venus Hendricks MD,      Thank you for referring Mr. Myrtle Dancer to [facility] for evaluation. My notes for this consultation are attached. If you have questions, please do not hesitate to call me. I look forward to following your patient along with you.       Sincerely,    Elva Chaudhry MD

## 2023-01-12 NOTE — PATIENT INSTRUCTIONS
Cervical Spinal Fusion: Before Your Surgery  What is cervical spinal fusion? Cervical spinal fusion is surgery that joins two or more of the vertebrae in your neck. When these bones are joined together, it's called fusion. After the joints are fused, they can no longer move. During the surgery, the doctor uses bone to make a \"bridge\" between your vertebrae. This bridge may be strengthened with metal plates and screws. In most cases, the doctor uses bone from another part of your body or bone that has been donated to a bone bank. But sometimes human-made bone is used. To do the surgery, the doctor makes a cut in either the front or the back of your neck. The cut is called an incision. It leaves a scar that fades with time. After surgery, you will have a short hospital stay. Your neck will feel stiff or sore. You will get medicine to help with pain. Most people can go back to work after 4 to 6 weeks. But it may take a few months to get back to your usual activities. How do you prepare for surgery? Surgery can be stressful. This information will help you understand what you can expect. And it will help you safely prepare for surgery. Preparing for surgery    Be sure you have someone to take you home. Anesthesia and pain medicine will make it unsafe for you to drive or get home on your own. Understand exactly what surgery is planned, along with the risks, benefits, and other options. If you take a medicine that prevents blood clots, your doctor may tell you to stop taking it before your surgery. Or your doctor may tell you to keep taking it. (These medicines include aspirin and other blood thinners.) Make sure that you understand exactly what your doctor wants you to do. Tell your doctor ALL the medicines, vitamins, supplements, and herbal remedies you take. Some may increase the risk of problems during your surgery.  Your doctor will tell you if you should stop taking any of them before the surgery and how soon to do it. Make sure your doctor and the hospital have a copy of your advance directive. If you don't have one, you may want to prepare one. It lets others know your health care wishes. It's a good thing to have before any type of surgery or procedure. What happens on the day of surgery? Follow the instructions exactly about when to stop eating and drinking. If you don't, your surgery may be canceled. If your doctor told you to take your medicines on the day of surgery, take them with only a sip of water. Take a bath or shower before you come in for your surgery. Do not apply lotions, perfumes, deodorants, or nail polish. Do not shave the surgical site yourself. Take off all jewelry and piercings. And take out contact lenses, if you wear them. At the hospital or surgery center   Bring a picture ID. The area for surgery is often marked to make sure there are no errors. You will be kept comfortable and safe by your anesthesia provider. You will be asleep during the surgery. The surgery usually takes 2 to 4 hours. If more than two vertebrae are being fused together, it will take longer. When you wake up, you will be lying on your back. You will have a soft or hard collar around your neck. This will protect and support your neck. It will also keep you from turning your head. You may have a small plastic tube coming out of your incision. This is to drain fluids. It's usually taken out in 1 or 2 days. When should you call your doctor? You have questions or concerns. You do not understand how to prepare for your surgery. You become ill before surgery (such as fever, flu, or a cold). You need to reschedule or have changed your mind about having the surgery. Where can you learn more? Go to http://www.gray.com/  Enter N543 in the search box to learn more about \"Cervical Spinal Fusion: Before Your Surgery. \"  Current as of: March 9, 2022               Content Version: 13.4  © 8144-6359 Healthwise, [facility]. Care instructions adapted under license by [employer] (which disclaims liability or warranty for this information). If you have questions about a medical condition or this instruction, always ask your healthcare professional. [place] [address] any warranty or liability for your use of this information.

## 2023-02-20 DIAGNOSIS — M54.2 NECK PAIN: Primary | ICD-10-CM

## 2023-02-20 DIAGNOSIS — M54.12 CERVICAL RADICULOPATHY: ICD-10-CM

## 2023-02-20 DIAGNOSIS — M50.90 CERVICAL DISC DISEASE: ICD-10-CM

## 2023-02-20 DIAGNOSIS — M48.02 CERVICAL STENOSIS OF SPINAL CANAL: ICD-10-CM

## 2023-03-14 NOTE — H&P
River Almaraz (: 1945) is a 78 y.o. male, patient, here for evaluation of the following chief complaint(s):  Neck Pain (Cervical MRI 10/14/22)        ASSESSMENT/PLAN:     Below is the assessment and plan developed based on review of pertinent history, physical exam, labs, studies, and medications. We reviewed the MRI of his cervical spine. He has developed severe spondylosis at C3-4 C4-5 C5-6 and C6-7. 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 He is having worsening neck pain as well as shoulder pain. He is also having some increased weakness in the hands. He has tried conservative treatment over the last 6 months without relief. I think is a candidate for an ACDF from C3-C7 for decompression stabilization. Risk and benefits were discussed with him and his wife. Procedure: ACDF C3-C7        The risks and benefits were discussed at length with the patient and the patient has elected to proceed. Indications for surgery include failed conservative treatment. Alternative treatments, risks and the perioperative course were discussed with the patient. All questions were answered. The risks and benefits of the procedure were explained. Benefits include definitive diagnosis, relief of pain, elimination of deformity and improved function. Risks of surgery including bleeding, infection, weakness, numbness, CSF leak, failure to improve symptoms, exacerbation of medical co-morbidities and even death were discussed with the patient. 1. Neck pain  -     XR SPINE CERV PA LAT ODONT 3 V MAX; Future  2. Cervical radiculopathy  3. Cervical stenosis of spinal canal  4. Cervical disc disease      Date of Surgery Update:  River Almaraz was seen and examined. History and physical has been reviewed. The patient has been examined.  There have been no significant clinical changes since the completion of the originally dated History and Physical.    Signed By: Yue Pedersener, MD     2023 4:08 AM          No follow-ups on file. SUBJECTIVE/OBJECTIVE:  Keon Hilton (: 1945) is a 78 y.o. male. No flowsheet data found. He comes in today on referral from sports medicine for neck pain and bilateral shoulder pain as well as hand weakness. He had chronic neck pain has been worsening over the last year. He is try conservative treatment including physical therapy for the neck and shoulders without long-term relief. He has an MRI for review. He has no ataxia but does still have some weakness in the hands and shoulders. He denies any bowel bladder difficulties     Imaging:     XR Results (most recent):  Results from Appointment encounter on 23     XR SPINE CERV PA LAT ODONT 3 V MAX     Narrative  AP and lateral and flexion-extension of the cervical spine reviewed today. Multilevel cervical spondylosis noted. Most significant at C3-4 and C5-6. No fractures or lytic lesions        Reviewed the cervical MRI. Spondylosis is noted throughout the cervical spine from C3-C7. Cervical stenosis is noted at those levels as well. Moderate to severe at C6-7 and C5-6. Moderate C3-4       MRI Results (most recent):  Results from Appointment encounter on 22     MRI CERV SPINE WO CONT     Narrative  EXAM: MRI CERV SPINE WO CONT     INDICATION: NECK PAIN. Clinical diagnosis of cervical radiculopathy. COMPARISON: Cervical spine views on 10/14/2022 and 2022. TECHNIQUE: MR imaging of the cervical spine was performed using the following  sequences: sagittal T1, T2, STIR;  axial T2, T1.     CONTRAST:  None. FINDINGS:     2 mm posterior subluxation of C3 on C4 is new versus more conspicuous in this  position. No other subluxation. Vertebral body heights are maintained. Degenerative bone marrow edema is mild in the C3 and C6 vertebral bodies. Moderate facet arthrosis at C4-C5. Multilevel disc desiccation is moderate at  C3-4 and C6-7.  No discitis. The craniocervical junction is intact. Ventral cord indentation at C3-4, C5-6,  and C6-7. No cord contusion or syrinx. Mild atrophy of the paraspinal musculature. C2-C3: No herniation or stenosis. C3-C4: Asymmetric left posterior disc osteophyte complex. Moderate central  spinal canal stenosis. Moderate left and mild right foraminal stenosis. C4-C5: Facet arthrosis. Mild left foraminal stenosis. C5-C6: Lobulated posterior disc osteophyte complex. Moderate central spinal  canal stenosis. Mild bilateral foraminal stenosis. C6-C7: Lobulated posterior disc osteophyte complex. Moderate central spinal  canal stenosis. Severe right and moderate left foraminal stenosis. C7-T1: No herniation or stenosis. Impression  1. Moderate central spinal canal stenosis at C3-C4, C5-C6, and C6-C7.  2. Severe right foraminal stenosis at C6-C7. 3. Ventral cord indentation without cord compression or cord contusion. Allergies   Allergen Reactions    Nsaids (Non-Steroidal Anti-Inflammatory Drug) Other (comments)       Constipation                 Current Outpatient Medications   Medication Sig    methylPREDNISolone (MEDROL DOSEPACK) 4 mg tablet Take 1 Tablet by mouth Specific Days and Specific Times. Used as directed    hydrALAZINE (APRESOLINE) 50 mg tablet Take 50 mg by mouth three (3) times daily. tamsulosin (FLOMAX) 0.4 mg capsule Take 0.4 mg by mouth daily. calcium-cholecalciferol, d3, 600-125 mg-unit tab Take  by mouth. doxycycline (ADOXA) 100 mg tablet Take 100 mg by mouth two (2) times a day. predniSONE (DELTASONE) 20 mg tablet Take  by mouth daily (with breakfast). multivitamin (ONE A DAY) tablet Take 1 Tab by mouth daily. Omeprazole delayed release (PRILOSEC D/R) 20 mg tablet Take 1 Tab by mouth daily. diltiazem CD (CARDIZEM CD) 240 mg ER capsule Take 1 Cap by mouth daily. pravastatin (PRAVACHOL) 40 mg tablet Take 1 Tab by mouth daily. sildenafil citrate (VIAGRA) 100 mg tablet Take 1 Tab by mouth as needed. omeprazole (PRILOSEC) 20 mg capsule      traZODone (DESYREL) 50 mg tablet Take  by mouth nightly. sertraline (ZOLOFT) 100 mg tablet Take  by mouth daily. aspirin 81 mg Tab Take  by mouth daily. No current facility-administered medications for this visit. Past Medical History:   Diagnosis Date    Chronic kidney disease      Dyspepsia and other specified disorders of function of stomach      Hypercholesterolemia      Hypertension      Ill-defined condition       enlarged prostate    Other ill-defined conditions(799.89)       high cholest.    Psychiatric disorder       PTSD               Past Surgical History:   Procedure Laterality Date    HX CATARACT REMOVAL   2006    HX HERNIA REPAIR        HX ORTHOPAEDIC   2014     elbow surgery               Family History   Problem Relation Age of Onset    Cancer Mother      Cancer Sister           Social History            Tobacco Use    Smoking status: Former       Types: Cigarettes       Quit date: 1995       Years since quittin.0    Smokeless tobacco: Never   Substance Use Topics    Alcohol use: Yes       Comment: rarely         Review of Systems        Vitals:  Ht 6' (1.829 m)   Wt 218 lb (98.9 kg)   BMI 29.57 kg/m²    Body mass index is 29.57 kg/m². Ortho Exam         Alert and Udell  x 3     Normal gait and station; normal posture     No assistive devices today. Lumbar spine:  Examination of the lumbar spine demonstrates no tenderness on palpation, no pain, no swelling or edema, with normal lumbar range of motion. Thoracic spine: Examination of the thoracic spine demonstrates no tenderness on palpation, no pain, no swelling or edema. Normal sensation and range of motion. Cervical spine:      Examination of the cervical spine demonstrates on inspection: No obvious cutaneous markings mostly in the cervical lordosis. No step-offs noted. .     On palpation: General tenderness on palpation cervical thoracic junction and across both shoulders. Range of motion: Normal range of motion. Increased pain with extension and rotation bilaterally. Decreased rotation bilaterally to 30 degrees. Motor examination:  Right deltoid 4/5,  left deltoid 4/5, right bicep 5/5, left bicep 4/5, right wrist extensor 5/5, left wrist extensor 4/5, right triceps 5/5, left triceps 5/5, right intrinsics 5/5, left intrinsics     Sensory examination: Reveals no gross deficits     Reflexes: Left bicep 2/2, left triceps 2/2, right biceps 2/2, right triceps 2/2     Functional testing: Spurling's exam is positive bilaterally; upper limb tension test is positive bilaterally     Cr's signs positive on the left. An electronic signature was used to authenticate this note.   -- Bud Diallo MD

## 2023-03-15 ENCOUNTER — HOSPITAL ENCOUNTER (OUTPATIENT)
Dept: PREADMISSION TESTING | Age: 78
Discharge: HOME | End: 2023-03-15
Payer: MEDICARE

## 2023-03-15 VITALS
SYSTOLIC BLOOD PRESSURE: 144 MMHG | WEIGHT: 219.8 LBS | BODY MASS INDEX: 29.77 KG/M2 | HEIGHT: 72 IN | DIASTOLIC BLOOD PRESSURE: 76 MMHG | HEART RATE: 56 BPM | TEMPERATURE: 97.7 F

## 2023-03-15 LAB
ABO + RH BLD: NORMAL
ANION GAP SERPL CALC-SCNC: 4 MMOL/L (ref 5–15)
APPEARANCE UR: CLEAR
ATRIAL RATE: 53 BPM
BACTERIA URNS QL MICRO: NEGATIVE /HPF
BILIRUB UR QL: NEGATIVE
BLOOD GROUP ANTIBODIES SERPL: NORMAL
BUN SERPL-MCNC: 18 MG/DL (ref 6–20)
BUN/CREAT SERPL: 10 (ref 12–20)
CALCIUM SERPL-MCNC: 9.2 MG/DL (ref 8.5–10.1)
CALCULATED P AXIS, ECG09: 41 DEGREES
CALCULATED R AXIS, ECG10: -46 DEGREES
CALCULATED T AXIS, ECG11: -38 DEGREES
CHLORIDE SERPL-SCNC: 111 MMOL/L (ref 97–108)
CO2 SERPL-SCNC: 26 MMOL/L (ref 21–32)
COLOR UR: ABNORMAL
CREAT SERPL-MCNC: 1.8 MG/DL (ref 0.7–1.3)
DIAGNOSIS, 93000: NORMAL
EPITH CASTS URNS QL MICRO: ABNORMAL /LPF
ERYTHROCYTE [DISTWIDTH] IN BLOOD BY AUTOMATED COUNT: 16.7 % (ref 11.5–14.5)
EST. AVERAGE GLUCOSE BLD GHB EST-MCNC: 108 MG/DL
GLUCOSE SERPL-MCNC: 96 MG/DL (ref 65–100)
GLUCOSE UR STRIP.AUTO-MCNC: NEGATIVE MG/DL
HBA1C MFR BLD: 5.4 % (ref 4–5.6)
HCT VFR BLD AUTO: 39.5 % (ref 36.6–50.3)
HGB BLD-MCNC: 13.2 G/DL (ref 12.1–17)
HGB UR QL STRIP: NEGATIVE
HYALINE CASTS URNS QL MICRO: ABNORMAL /LPF (ref 0–5)
INR PPP: 1 (ref 0.9–1.1)
KETONES UR QL STRIP.AUTO: NEGATIVE MG/DL
LEUKOCYTE ESTERASE UR QL STRIP.AUTO: NEGATIVE
MCH RBC QN AUTO: 28.2 PG (ref 26–34)
MCHC RBC AUTO-ENTMCNC: 33.4 G/DL (ref 30–36.5)
MCV RBC AUTO: 84.4 FL (ref 80–99)
NITRITE UR QL STRIP.AUTO: NEGATIVE
NRBC # BLD: 0 K/UL (ref 0–0.01)
NRBC BLD-RTO: 0 PER 100 WBC
P-R INTERVAL, ECG05: 152 MS
PH UR STRIP: 6.5 (ref 5–8)
PLATELET # BLD AUTO: 266 K/UL (ref 150–400)
PMV BLD AUTO: 11.3 FL (ref 8.9–12.9)
POTASSIUM SERPL-SCNC: 4.1 MMOL/L (ref 3.5–5.1)
PROT UR STRIP-MCNC: 30 MG/DL
PROTHROMBIN TIME: 10.8 SEC (ref 9–11.1)
Q-T INTERVAL, ECG07: 434 MS
QRS DURATION, ECG06: 94 MS
QTC CALCULATION (BEZET), ECG08: 407 MS
RBC # BLD AUTO: 4.68 M/UL (ref 4.1–5.7)
RBC #/AREA URNS HPF: ABNORMAL /HPF (ref 0–5)
SODIUM SERPL-SCNC: 141 MMOL/L (ref 136–145)
SP GR UR REFRACTOMETRY: 1.02 (ref 1–1.03)
SPECIMEN EXP DATE BLD: NORMAL
UA: UC IF INDICATED,UAUC: ABNORMAL
UROBILINOGEN UR QL STRIP.AUTO: 1 EU/DL (ref 0.2–1)
VENTRICULAR RATE, ECG03: 53 BPM
WBC # BLD AUTO: 7.3 K/UL (ref 4.1–11.1)
WBC URNS QL MICRO: ABNORMAL /HPF (ref 0–4)

## 2023-03-15 PROCEDURE — 83036 HEMOGLOBIN GLYCOSYLATED A1C: CPT

## 2023-03-15 PROCEDURE — 80048 BASIC METABOLIC PNL TOTAL CA: CPT

## 2023-03-15 PROCEDURE — 86900 BLOOD TYPING SEROLOGIC ABO: CPT

## 2023-03-15 PROCEDURE — 36415 COLL VENOUS BLD VENIPUNCTURE: CPT

## 2023-03-15 PROCEDURE — 85027 COMPLETE CBC AUTOMATED: CPT

## 2023-03-15 PROCEDURE — 85610 PROTHROMBIN TIME: CPT

## 2023-03-15 PROCEDURE — 81001 URINALYSIS AUTO W/SCOPE: CPT

## 2023-03-15 PROCEDURE — 93005 ELECTROCARDIOGRAM TRACING: CPT

## 2023-03-15 RX ORDER — CHOLECALCIFEROL (VITAMIN D3) 125 MCG
CAPSULE ORAL DAILY
COMMUNITY

## 2023-03-15 RX ORDER — HYDRALAZINE HYDROCHLORIDE 50 MG/1
50 TABLET, FILM COATED ORAL 2 TIMES DAILY
COMMUNITY

## 2023-03-15 RX ORDER — METOPROLOL SUCCINATE 50 MG/1
TABLET, EXTENDED RELEASE ORAL DAILY
COMMUNITY

## 2023-03-15 NOTE — PERIOP NOTES
[facility] INSTRUCTIONS  ORTHOPAEDIC    Surgery Date:   3/22/23    Your surgeon's office or [facility] staff will call you between 4 PM- 8 PM the day before surgery with your arrival time. If your surgery is on a Monday, you will receive a call the preceding Friday. Please report to [facility] Patient Access/Admitting on the 1st floor. Bring your insurance card, photo identification, and any copayment (if applicable). If you are going home the same day of your surgery, you must have a responsible adult to drive you home. You need to have a responsible adult to stay with you the first 24 hours after surgery and you should not drive a car for 24 hours following your surgery. Do NOT eat any solid foods after midnight the night before surgery including candy, mints or gum. You may drink clear liquids from midnight until 1 hour prior to arrival time. You may drink up to 12 ounces at one time every 4 hours. Do NOT drink alcohol or smoke 24 hours before surgery. STOP smoking for 14 days prior as it helps with breathing and healing after surgery. If your arrival time is 3pm or later, you may eat a light breakfast before 8am (toast, bagel-no butter, black coffee, plain tea, fruit juice-no pulp) Please note special instructions, if applicable, below for medications. If you are being admitted to the hospital,please leave personal belongings/luggage in your car until you have an assigned hospital room number. Please wear comfortable clothes. Wear your glasses instead of contacts. We ask that all money, jewelry and valuables be left at home. Wear no make up, particularly mascara, the day of surgery. All body piercings, rings, and jewelry need to be removed and left at home. Please remove any nail polish or artificial nails from your fingernails. Please wear your hair loose or down. Please no pony-tails, buns, or any metal hair accessories.  If you shower the morning of surgery, please do not apply any lotions or powders afterwards. You may wear deodorant. Do not shave any body area within 24 hours of your surgery. Please follow all instructions to avoid any potential surgical cancellation. Should your physical condition change, (i.e. fever, cold, flu, etc.) please notify your surgeon as soon as possible. It is important to be on time. If a situation occurs where you may be delayed, please call:  [phone] / [phone] on the day of surgery. The Preadmission Testing staff can be reached at [phone]. Special instructions: BRING ADVANCE DIRECTIVE ON DAY OF SURGERY    Current Outpatient Medications   Medication Sig    hydrALAZINE (APRESOLINE) 50 mg tablet Take 50 mg by mouth two (2) times a day. metoprolol succinate (TOPROL-XL) 50 mg XL tablet Take  by mouth daily. cholecalciferol, vitamin D3, (Vitamin D3) 50 mcg (2,000 unit) tab Take  by mouth daily. tamsulosin (FLOMAX) 0.4 mg capsule Take 0.4 mg by mouth daily. calcium-cholecalciferol, d3, 600-125 mg-unit tab Take  by mouth.    multivitamin (ONE A DAY) tablet Take 1 Tab by mouth daily. Omeprazole delayed release (PRILOSEC D/R) 20 mg tablet Take 1 Tab by mouth daily. diltiazem CD (CARDIZEM CD) 240 mg ER capsule Take 1 Cap by mouth daily. pravastatin (PRAVACHOL) 40 mg tablet Take 1 Tab by mouth daily. (Patient taking differently: Take 40 mg by mouth nightly.)    sildenafil citrate (VIAGRA) 100 mg tablet Take 1 Tab by mouth as needed. traZODone (DESYREL) 50 mg tablet Take  by mouth nightly. aspirin 81 mg Tab Take  by mouth daily. No current facility-administered medications for this encounter.        YOU MUST ONLY TAKE THESE MEDICATIONS THE MORNING OF SURGERY WITH A SIP OF WATER: HYDRALAZINE,METOPROLOL,OMEPRAZOLE,DILTIAZEM   MEDICATIONS TO TAKE THE MORNING OF SURGERY ONLY IF NEEDED:   HOLD these prescription medications BEFORE Surgery: HOLD SILDENAFIL 24 HOURS PRIOR TO SURGERY  Ask your surgeon/prescribing physician about when/if to STOP taking these medications:   Stop any non-steroidal anti-inflammatory drugs (i.e. Ibuprofen, Naproxen, Advil, Aleve) 3 days before surgery. You may take Tylenol. STOP all vitamins and herbal supplements 1 week prior to  surgery. If you are currently taking Plavix, Coumadin, or any other blood-thinning/anticoagulant medication c  STOP VITAMIN D AND CALCIUM ON 3/15/23ontact your prescribing physician for instructions. Preventing Infections Before and After - Your Surgery    IMPORTANT INSTRUCTIONS    You play an important role in your health and preparation for surgery. To reduce the germs on your skin you will need to shower with CHG soap (Chorhexidine gluconate 4%) two times before surgery. CHG soap (Hibiclens, Hex-A-Clens or store brand)  CHG soap will be provided at your Preadmission Testing (PAT) appointment. If you do not have a PAT appointment before surgery, you may arrange to  CHG soap from our office or purchase CHG soap at a pharmacy, grocery or department store. You need to purchase TWO 4 ounce bottles to use for your 2 showers. Steps to follow:  Bill Bang your hair with your normal shampoo and your body with regular soap and rinse well to remove shampoo and soap from your skin. Wet a clean washcloth and turn off the shower. Put CHG soap on washcloth and apply to your entire body from the neck down. Do not use on your head, face or private parts(genitals). Do not use CHG soap on open sores, wounds or areas of skin irritation. Wash you body gently for 5 minutes. Do not wash your skin too hard. This soap does not create lather. Pay special attention to your underarms and from your belly button to your feet. Turn the shower back on and rinse well to get CHG soap off your body. Pat your skin dry with a clean, dry towel. Do not apply lotions or moisturizer. Put on clean clothes and sleep on fresh bed sheets and do not allow pets to sleep with you.     Shower with CHG soap 2 times before your surgery  The evening before your surgery  The morning of your surgery      Tips to help prevent infections after your surgery:  Protect your surgical wound from germs:  Hand washing is the most important thing you and your caregivers can do to prevent infections. Keep your bandage clean and dry! Do not touch your surgical wound. Use clean, freshly washed towels and washcloths every time you shower; do not share bath linens with others. Until your surgical wound is healed, wear clothing and sleep on bed linens each day that are clean and freshly washed. Do not allow pets to sleep in your bed with you or touch your surgical wound. Do not smoke - smoking delays wound healing. This may be a good time to stop smoking. If you have diabetes, it is important for you to manage your blood sugar levels properly before your surgery as well as after your surgery. Poorly managed blood sugar levels slow down wound healing and prevent you from healing completely. Prevention of Infection  Testing for Staphylococcus aureus on your skin before surgery    Staphylococcus aureus (staph) is a common bacteria that is found on the body. It normally does not cause infection on healthy skin. Before surgery, you will be tested to see if you have staph by swabbing the inside of your nose. When you have an incision with surgery, the goal is to protect that incision from infection. Removal of the staph bacteria before surgery can decrease the risk of a surgical site infection. If your nose swab is positive for staph you will be called. Your treatment will include 2 steps:  Prescription for Mupirocin ointment to be used in each nostril twice a day for 5 days. Showering with Chlorhexidine (CHG) liquid soap for 5 days prior to surgery. How to use Mupirocin ointment in your nose   the prescription from your pharmacy.  You will receive a large tube of ointment which will be big enough for all of your treatments. You will apply this ointment to each nostril 2 times a day for 5 days. Wash your hands with  gel or soap and water for 20 seconds before using ointment. Place a pea-sized amount of ointment on a cotton Q-tip. Apply ointment just inside of each nostril with the Q-tip. Do not push Q-tip or ointment deep inside you nose. Press your nostrils together and massage for a few seconds. Wash your hands with  gel or soap and water after you are finished. Do not get ointment near your eyes. If it gets into your eyes, rinse them with cool water. If you need to use nasal spray, clean the tip of the bottle with alcohol before use and do not use both at the same time. If you are scheduled for COVID testing during the 5 days, do NOT apply morning dose until after the COVID test has been performed. How to use Chlorhexidine (CHG) 4% liquid soap  Purchase an 8 ounce bottle of CHG liquid soap (Chlorhexidine 4%, Hibiclens, Hex-A-Clens or store brand) at a pharmacy or grocery store. Wash your hair with your normal shampoo and your body with regular soap and rinse well to remove shampoo and soap from your skin. Wet a clean washcloth and turn off the shower. Put CHG soap on washcloth and apply to your entire body from the neck down. Do not use on your head, face or private parts(genitals). Do not use CHG soap on open sores, wounds or areas of skin irritation. Wash your body gently for 5 minutes. Do not wash your skin too hard. This soap does not create lather. Pay special attention to your underarms and from your belly button to your feet. Turn the shower back on and rinse well to get CHG soap off your body. Pat your skin dry with a clean, dry towel. Do not apply lotions or moisturizer. Put on clean clothes and sleep on fresh bed sheets the night before surgery. Do not allow pets to sleep with you.     Eating and Drinking Before Surgery    You may eat a regular dinner at the usual time on the day before your surgery. Do NOT eat any solid foods after midnight unless your arrival time at the hospital is 3pm or later. You may drink clear liquids only from 12 midnight until 1 hours prior to your arrival time at the hospital on the day of your surgery. Do NOT drink alcohol. Clear liquids include:  Water  Fruit juices without pulp( i.e. apple juice)  Carbonated beverages  Black coffee (no cream/milk)  Tea (no cream/milk)  Gatorade  You may drink up to 12-16 ounces at one time every 4 hours between the hours of midnight and 1 hour before your arrival time at the hospital. Example- if your arrival time at the hospital is 6am, you may drink 12-16 ounces of clear liquids no later than 5am.  If your arrival time at the hospital is 3pm or later, you may eat a light breakfast before 8am.  A light breakfast includes: Toast or bagel (no butter)  Black coffee (no cream/milk)  Tea (no cream/milk)  Fruit juices without pulp ( i.e. apple juice)  Do NOT eat meat, eggs, vegetables or fruit  If you have any questions, please contact your surgeon's office. Patient Information Regarding COVID Restrictions    Day of Procedure    Please park in the parking deck or any designated visitor parking lot. Enter the facility through the Main Entrance of the hospital.  On the day of surgery, please provide the cell phone number of the person who will be waiting for you to the Patient Access representative at the time of registration. Masks are highly recommended in the hospital, but not required. Once your procedure and the immediate recovery period is completed, a nurse in the recovery area will contact your designated visitor to inform them of your room number or to otherwise review other pertinent information regarding your care. Social distancing practices are strongly encouraged in waiting areas and the cafeteria. The patient was contacted in person.    He verbalized understanding of all instructions does not  need reinforcement.

## 2023-03-16 LAB
BACTERIA SPEC CULT: NORMAL
BACTERIA SPEC CULT: NORMAL
SERVICE CMNT-IMP: NORMAL

## 2023-03-16 NOTE — PERIOP NOTES
PAT Nurse Practitioner   Pre-Operative Chart Review/Assessment:-ORTHOPEDIC/NEUROSURGICAL SPINE                Patient Name:  Liz Mejia                                                           Age:   78 y.o.    :  1945     Today's Date:  3/17/2023     Date of PAT:   3/15/23      Date of Surgery:    3/22/23      Procedure(s):  C3-C7 ACDF     Surgeon:   Dr. Ceil Kanner:       1)  PCP: Pamela Luevano MD        2)  Cardiac Clearance: EKG and METs reviewed. PAT EKG showed sinus darrian; HR-53, LAFB, non-specific ST/T wave abn, LVH, and PACs . EKG to anesthesia for review. EKG from 3/15/23 reviewed by Dr. Mary Rush, anesthesiologist and compared with previous EKG from 16. Planned procedure, PMHx, functional status reviewed. Pt ok to proceed with planned procedure per Dr. Mary Rush. 3)  Diabetic Treatment Consult: Not indicated. A1c-5.4       4)  Sleep Apnea evaluation:  Not indicated.  PATRICK Score 3.       5)  Treatment for MRSA/Staph Aureus: Neg       6)  Additional Concerns: Former smoker, HTN, CKD 3(Cr-1.8 on PAT labs), PTSD, BPH              Vital Signs:         Vitals:    03/15/23 1053   BP: (!) 144/76   Pulse: (!) 56   Temp: 97.7 °F (36.5 °C)   Weight: 99.7 kg (219 lb 12.8 oz)   Height: 6' (1.829 m)          Body mass index is 29.81 kg/m².       ____________________________________________  PAST MEDICAL HISTORY  Past Medical History:   Diagnosis Date    Arthritis     BPH (benign prostatic hyperplasia)     Chronic kidney disease     Chronic pain     Dyspepsia and other specified disorders of function of stomach     Hypercholesterolemia     Hypertension     Ill-defined condition     enlarged prostate    Other ill-defined conditions(799.89)     high cholest.    Psychiatric disorder     PTSD    Sleep apnea     NOT ON CPAP      ____________________________________________  PAST SURGICAL HISTORY  Past Surgical History:   Procedure Laterality Date    HX CATARACT REMOVAL  2006 HX HERNIA REPAIR  1614    UMBILICAL    HX ORTHOPAEDIC Left 2014    elbow surgery     ____________________________________________  HOME MEDICATIONS    Current Outpatient Medications   Medication Sig    hydrALAZINE (APRESOLINE) 50 mg tablet Take 50 mg by mouth two (2) times a day. metoprolol succinate (TOPROL-XL) 50 mg XL tablet Take  by mouth daily. cholecalciferol, vitamin D3, (Vitamin D3) 50 mcg (2,000 unit) tab Take  by mouth daily. tamsulosin (FLOMAX) 0.4 mg capsule Take 0.4 mg by mouth daily. calcium-cholecalciferol, d3, 600-125 mg-unit tab Take  by mouth.    multivitamin (ONE A DAY) tablet Take 1 Tab by mouth daily. Omeprazole delayed release (PRILOSEC D/R) 20 mg tablet Take 1 Tab by mouth daily. diltiazem CD (CARDIZEM CD) 240 mg ER capsule Take 1 Cap by mouth daily. pravastatin (PRAVACHOL) 40 mg tablet Take 1 Tab by mouth daily. (Patient taking differently: Take 40 mg by mouth nightly.)    sildenafil citrate (VIAGRA) 100 mg tablet Take 1 Tab by mouth as needed. traZODone (DESYREL) 50 mg tablet Take  by mouth nightly. aspirin 81 mg Tab Take  by mouth daily.      No current facility-administered medications for this encounter.      ____________________________________________  ALLERGIES  Allergies   Allergen Reactions    Nsaids (Non-Steroidal Anti-Inflammatory Drug) Other (comments)     Constipation        ____________________________________________  SOCIAL HISTORY  Social History     Tobacco Use    Smoking status: Former     Packs/day: 1.00     Years: 10.00     Pack years: 10.00     Types: Cigarettes     Quit date: 1995     Years since quittin.2    Smokeless tobacco: Never   Substance Use Topics    Alcohol use: Yes     Comment: rarely      ____________________________________________   Internal Administration   First Dose COVID-19, PFIZER Bivalent BOOSTER, (age 12y+), IM, 30 mcg/0.3 mL dose  2021   Second Dose COVID-19, PFIZER Bivalent BOOSTER, (age 12y+), IM, 30 mcg/0.3 mL dose  03/06/2021      Last COVID Lab No results found for: Leopold Padma, RCV2CT, CVD2M, COV2, XPLCVT, HOUUSTS4BZF, SDFDZKB3PPUM, COVV, Ishan Centeno, [address], [address] [place] [address], [address] [address] [place]               ____________________________________________    Labs:     Hospital Outpatient Visit on 03/15/2023   Component Date Value Ref Range Status    Sodium 03/15/2023 141  136 - 145 mmol/L Final    Potassium 03/15/2023 4.1  3.5 - 5.1 mmol/L Final    Chloride 03/15/2023 111 (A)  97 - 108 mmol/L Final    CO2 03/15/2023 26  21 - 32 mmol/L Final    Anion gap 03/15/2023 4 (A)  5 - 15 mmol/L Final    Glucose 03/15/2023 96  65 - 100 mg/dL Final    BUN 03/15/2023 18  6 - 20 MG/DL Final    Creatinine 03/15/2023 1.80 (A)  0.70 - 1.30 MG/DL Final    BUN/Creatinine ratio 03/15/2023 10 (A)  12 - 20   Final    eGFR 03/15/2023 39 (A)  >60 ml/min/1.73m2 Final    Comment:      Pediatric calculator link: CarWashShow.at. org/professionals/kdoqi/gfr_calculatorped       These results are not intended for use in patients <25years of age. eGFR results are calculated without a race factor using  the 2021 CKD-EPI equation. Careful clinical correlation is recommended, particularly when comparing to results calculated using previous equations. The CKD-EPI equation is less accurate in patients with extremes of muscle mass, extra-renal metabolism of creatinine, excessive creatine ingestion, or following therapy that affects renal tubular secretion.       Calcium 03/15/2023 9.2  8.5 - 10.1 MG/DL Final    WBC 03/15/2023 7.3  4.1 - 11.1 K/uL Final    RBC 03/15/2023 4.68  4.10 - 5.70 M/uL Final    HGB 03/15/2023 13.2  12.1 - 17.0 g/dL Final    HCT 03/15/2023 39.5  36.6 - 50.3 % Final    MCV 03/15/2023 84.4  80.0 - 99.0 FL Final    MCH 03/15/2023 28.2  26.0 - 34.0 PG Final    MCHC 03/15/2023 33.4  30.0 - 36.5 g/dL Final    RDW 03/15/2023 16.7 (A)  11.5 - 14.5 % Final    PLATELET 16/50/4380 114  150 - 400 K/uL Final    MPV 03/15/2023 11.3  8.9 - 12.9 FL Final    NRBC 03/15/2023 0.0  0  WBC Final    ABSOLUTE NRBC 03/15/2023 0.00  0.00 - 0.01 K/uL Final    Crossmatch Expiration 03/15/2023 03/25/2023,2359   Final    ABO/Rh(D) 03/15/2023 B POSITIVE   Final    Antibody screen 03/15/2023 NEG   Final    INR 03/15/2023 1.0  0.9 - 1.1   Final    A single therapeutic range for Vit K antagonists may not be optimal for all indications - see June, 2008 issue of Chest, American College of Chest Physicians Evidence-Based Clinical Practice Guidelines, 8th Edition. Prothrombin time 03/15/2023 10.8  9.0 - 11.1 sec Final    Color 03/15/2023 YELLOW/STRAW    Final    Color Reference Range: Straw, Yellow or Dark Yellow    Appearance 03/15/2023 CLEAR  CLEAR   Final    Specific gravity 03/15/2023 1.016  1.003 - 1.030   Final    pH (UA) 03/15/2023 6.5  5.0 - 8.0   Final    Protein 03/15/2023 30 (A)  NEG mg/dL Final    Glucose 03/15/2023 Negative  NEG mg/dL Final    Ketone 03/15/2023 Negative  NEG mg/dL Final    Bilirubin 03/15/2023 Negative  NEG   Final    Blood 03/15/2023 Negative  NEG   Final    Urobilinogen 03/15/2023 1.0  0.2 - 1.0 EU/dL Final    Nitrites 03/15/2023 Negative  NEG   Final    Leukocyte Esterase 03/15/2023 Negative  NEG   Final    UA:UC IF INDICATED 03/15/2023 CULTURE NOT INDICATED BY UA RESULT  CNI   Final    WBC 03/15/2023 0-4  0 - 4 /hpf Final    RBC 03/15/2023 0-5  0 - 5 /hpf Final    Epithelial cells 03/15/2023 FEW  FEW /lpf Final    Epithelial cell category consists of squamous cells and /or transitional urothelial cells. Renal tubular cells, if present, are separately identified as such.     Bacteria 03/15/2023 Negative  NEG /hpf Final    Hyaline cast 03/15/2023 0-2  0 - 5 /lpf Final    Ventricular Rate 03/15/2023 53  BPM Final    Atrial Rate 03/15/2023 53  BPM Final    P-R Interval 03/15/2023 152  ms Final    QRS Duration 03/15/2023 94  ms Final    Q-T Interval 03/15/2023 434  ms Final    QTC Calculation (Bezet) 03/15/2023 407  ms Final Calculated P Axis 03/15/2023 41  degrees Final    Calculated R Axis 03/15/2023 -46  degrees Final    Calculated T Axis 03/15/2023 -38  degrees Final    Diagnosis 03/15/2023    Final                    Value:Sinus bradycardia with premature atrial complexes  Left anterior fascicular block  Left ventricular hypertrophy ( R in aVL , Biscoe product , Romhilt-Bonilla )  Nonspecific ST and T wave abnormality  Abnormal ECG  When compared with ECG of 18-JUL-2016 19:23,  premature atrial complexes are now present  T wave inversion more evident in Inferior leads  Confirmed by Honey Leung MD ([id number]) on 3/15/2023 9:00:01 PM      Hemoglobin A1c 03/15/2023 5.4  4.0 - 5.6 % Final    Comment: NEW METHOD  PLEASE NOTE NEW REFERENCE RANGE  (NOTE)  HbA1C Interpretive Ranges  <5.7              Normal  5.7 - 6.4         Consider Prediabetes  >6.5              Consider Diabetes      Est. average glucose 03/15/2023 108  mg/dL Final    Special Requests: 03/15/2023 NO SPECIAL REQUESTS    Final    Culture result: 03/15/2023 MRSA NOT PRESENT    Final    Culture result: 03/15/2023     Final                    Value:Screening of patient nares for MRSA is for surveillance purposes and, if positive, to facilitate isolation considerations in high risk settings. It is not intended for automatic decolonization interventions per se as regimens are not sufficiently effective to warrant routine use. Skin:     Denies open wounds, cuts, sores, rashes or other areas of concern in PAT assessment.         Patience Cerda NP  Available via [facility]

## 2023-03-21 ENCOUNTER — ANESTHESIA EVENT (OUTPATIENT)
Dept: SURGERY | Age: 78
End: 2023-03-21
Payer: MEDICARE

## 2023-03-22 ENCOUNTER — HOSPITAL ENCOUNTER (OUTPATIENT)
Age: 78
Discharge: HOME HEALTH | End: 2023-03-24
Attending: ORTHOPAEDIC SURGERY | Admitting: ORTHOPAEDIC SURGERY
Payer: MEDICARE

## 2023-03-22 ENCOUNTER — DOCUMENTATION ONLY (OUTPATIENT)
Dept: ORTHOPEDIC SURGERY | Age: 78
End: 2023-03-22

## 2023-03-22 ENCOUNTER — APPOINTMENT (OUTPATIENT)
Dept: GENERAL RADIOLOGY | Age: 78
End: 2023-03-22
Attending: ORTHOPAEDIC SURGERY
Payer: MEDICARE

## 2023-03-22 ENCOUNTER — ANESTHESIA (OUTPATIENT)
Dept: SURGERY | Age: 78
End: 2023-03-22
Payer: MEDICARE

## 2023-03-22 DIAGNOSIS — K21.9 GASTROESOPHAGEAL REFLUX DISEASE WITHOUT ESOPHAGITIS: ICD-10-CM

## 2023-03-22 DIAGNOSIS — Z98.1 S/P CERVICAL SPINAL FUSION: ICD-10-CM

## 2023-03-22 DIAGNOSIS — M48.02 CERVICAL STENOSIS OF SPINAL CANAL: Primary | ICD-10-CM

## 2023-03-22 LAB
GLUCOSE BLD STRIP.AUTO-MCNC: 98 MG/DL (ref 65–117)
SERVICE CMNT-IMP: NORMAL

## 2023-03-22 PROCEDURE — C1889 IMPLANT/INSERT DEVICE, NOC: HCPCS | Performed by: ORTHOPAEDIC SURGERY

## 2023-03-22 PROCEDURE — 74011250636 HC RX REV CODE- 250/636: Performed by: PHYSICIAN ASSISTANT

## 2023-03-22 PROCEDURE — 77030004391 HC BUR FLUT MEDT -C: Performed by: ORTHOPAEDIC SURGERY

## 2023-03-22 PROCEDURE — 76010000172 HC OR TIME 2.5 TO 3 HR INTENSV-TIER 1: Performed by: ORTHOPAEDIC SURGERY

## 2023-03-22 PROCEDURE — C1713 ANCHOR/SCREW BN/BN,TIS/BN: HCPCS | Performed by: ORTHOPAEDIC SURGERY

## 2023-03-22 PROCEDURE — 74011000250 HC RX REV CODE- 250: Performed by: ORTHOPAEDIC SURGERY

## 2023-03-22 PROCEDURE — 77030026438 HC STYL ET INTUB CARD -A: Performed by: ANESTHESIOLOGY

## 2023-03-22 PROCEDURE — 77030002934 HC SUT MCRYL J&J -B: Performed by: ORTHOPAEDIC SURGERY

## 2023-03-22 PROCEDURE — 74011000250 HC RX REV CODE- 250: Performed by: STUDENT IN AN ORGANIZED HEALTH CARE EDUCATION/TRAINING PROGRAM

## 2023-03-22 PROCEDURE — 77030013079 HC BLNKT BAIR HGGR 3M -A: Performed by: ANESTHESIOLOGY

## 2023-03-22 PROCEDURE — 74011250637 HC RX REV CODE- 250/637: Performed by: STUDENT IN AN ORGANIZED HEALTH CARE EDUCATION/TRAINING PROGRAM

## 2023-03-22 PROCEDURE — 76210000016 HC OR PH I REC 1 TO 1.5 HR: Performed by: ORTHOPAEDIC SURGERY

## 2023-03-22 PROCEDURE — 74011000250 HC RX REV CODE- 250: Performed by: NURSE ANESTHETIST, CERTIFIED REGISTERED

## 2023-03-22 PROCEDURE — 74011250636 HC RX REV CODE- 250/636: Performed by: ANESTHESIOLOGY

## 2023-03-22 PROCEDURE — 2709999900 HC NON-CHARGEABLE SUPPLY: Performed by: ORTHOPAEDIC SURGERY

## 2023-03-22 PROCEDURE — 74011250637 HC RX REV CODE- 250/637: Performed by: ANESTHESIOLOGY

## 2023-03-22 PROCEDURE — 74011000258 HC RX REV CODE- 258: Performed by: NURSE ANESTHETIST, CERTIFIED REGISTERED

## 2023-03-22 PROCEDURE — 74011250636 HC RX REV CODE- 250/636: Performed by: STUDENT IN AN ORGANIZED HEALTH CARE EDUCATION/TRAINING PROGRAM

## 2023-03-22 PROCEDURE — 74011250636 HC RX REV CODE- 250/636: Performed by: NURSE ANESTHETIST, CERTIFIED REGISTERED

## 2023-03-22 PROCEDURE — 72020 X-RAY EXAM OF SPINE 1 VIEW: CPT

## 2023-03-22 PROCEDURE — 77030029099 HC BN WAX SSPC -A: Performed by: ORTHOPAEDIC SURGERY

## 2023-03-22 PROCEDURE — 74011250637 HC RX REV CODE- 250/637: Performed by: PHYSICIAN ASSISTANT

## 2023-03-22 PROCEDURE — 77030008684 HC TU ET CUF COVD -B: Performed by: ANESTHESIOLOGY

## 2023-03-22 PROCEDURE — 74011000250 HC RX REV CODE- 250: Performed by: PHYSICIAN ASSISTANT

## 2023-03-22 PROCEDURE — 51798 US URINE CAPACITY MEASURE: CPT

## 2023-03-22 PROCEDURE — 77030034475 HC MISC IMPL SPN: Performed by: ORTHOPAEDIC SURGERY

## 2023-03-22 PROCEDURE — 77030040506 HC DRN WND MDII -A: Performed by: ORTHOPAEDIC SURGERY

## 2023-03-22 PROCEDURE — 77030003832 HC BIT DRL ATLNTS MEDT -B: Performed by: ORTHOPAEDIC SURGERY

## 2023-03-22 PROCEDURE — 74011000250 HC RX REV CODE- 250: Performed by: ANESTHESIOLOGY

## 2023-03-22 PROCEDURE — 76060000036 HC ANESTHESIA 2.5 TO 3 HR: Performed by: ORTHOPAEDIC SURGERY

## 2023-03-22 PROCEDURE — 77030019908 HC STETH ESOPH SIMS -A: Performed by: ANESTHESIOLOGY

## 2023-03-22 PROCEDURE — 77030038600 HC TU BPLR IRR DISP STRY -B: Performed by: ORTHOPAEDIC SURGERY

## 2023-03-22 PROCEDURE — 77030035236 HC SUT PDS STRATFX BARB J&J -B: Performed by: ORTHOPAEDIC SURGERY

## 2023-03-22 PROCEDURE — 82962 GLUCOSE BLOOD TEST: CPT

## 2023-03-22 DEVICE — INTERBODY FUSION DEVICE  6 DEGREE MEDIUM 7MM
Type: IMPLANTABLE DEVICE | Site: SPINE CERVICAL | Status: FUNCTIONAL
Brand: ENDOSKELETON® TC NANOLOCK® SURFACE TECHNOLOGY

## 2023-03-22 DEVICE — GRAFT BNE SUB SM CANC FRZN MORSELIZED W/ VIABLE CELL: Type: IMPLANTABLE DEVICE | Site: SPINE CERVICAL | Status: FUNCTIONAL

## 2023-03-22 DEVICE — SCREW 3120516 4.0 X 16 SELF DRILL VAR
Type: IMPLANTABLE DEVICE | Site: SPINE CERVICAL | Status: FUNCTIONAL
Brand: ATLANTIS® ANTERIOR CERVICAL PLATE SYSTEM

## 2023-03-22 DEVICE — I-FACTOR PUTTY, 2.5CC SYRINGE
Type: IMPLANTABLE DEVICE | Site: SPINE CERVICAL | Status: FUNCTIONAL
Brand: I-FACTOR PEPTIDE ENHANCED BONE GRAFT

## 2023-03-22 DEVICE — GRAFT HUM TISS 3X4 CM WND COVERING AMNIO MEMBRN VERSASHIELD: Type: IMPLANTABLE DEVICE | Site: SPINE CERVICAL | Status: FUNCTIONAL

## 2023-03-22 RX ORDER — MIDAZOLAM HYDROCHLORIDE 1 MG/ML
1 INJECTION, SOLUTION INTRAMUSCULAR; INTRAVENOUS
Status: DISCONTINUED | OUTPATIENT
Start: 2023-03-22 | End: 2023-03-22 | Stop reason: HOSPADM

## 2023-03-22 RX ORDER — FERROUS SULFATE, DRIED 160(50) MG
1 TABLET, EXTENDED RELEASE ORAL DAILY
Status: DISCONTINUED | OUTPATIENT
Start: 2023-03-23 | End: 2023-03-24 | Stop reason: HOSPADM

## 2023-03-22 RX ORDER — ONDANSETRON 2 MG/ML
4 INJECTION INTRAMUSCULAR; INTRAVENOUS
Status: ACTIVE | OUTPATIENT
Start: 2023-03-22 | End: 2023-03-23

## 2023-03-22 RX ORDER — PHENYLEPHRINE HCL IN 0.9% NACL 0.4MG/10ML
SYRINGE (ML) INTRAVENOUS AS NEEDED
Status: DISCONTINUED | OUTPATIENT
Start: 2023-03-22 | End: 2023-03-22 | Stop reason: HOSPADM

## 2023-03-22 RX ORDER — PROPOFOL 10 MG/ML
INJECTION, EMULSION INTRAVENOUS
Status: DISCONTINUED | OUTPATIENT
Start: 2023-03-22 | End: 2023-03-22 | Stop reason: HOSPADM

## 2023-03-22 RX ORDER — SODIUM CHLORIDE 0.9 % (FLUSH) 0.9 %
5-40 SYRINGE (ML) INJECTION EVERY 8 HOURS
Status: DISCONTINUED | OUTPATIENT
Start: 2023-03-22 | End: 2023-03-22 | Stop reason: HOSPADM

## 2023-03-22 RX ORDER — FENTANYL CITRATE 50 UG/ML
50 INJECTION, SOLUTION INTRAMUSCULAR; INTRAVENOUS AS NEEDED
Status: DISCONTINUED | OUTPATIENT
Start: 2023-03-22 | End: 2023-03-22 | Stop reason: HOSPADM

## 2023-03-22 RX ORDER — PANTOPRAZOLE SODIUM 40 MG/1
40 TABLET, DELAYED RELEASE ORAL
Status: DISCONTINUED | OUTPATIENT
Start: 2023-03-23 | End: 2023-03-24 | Stop reason: HOSPADM

## 2023-03-22 RX ORDER — DIPHENHYDRAMINE HYDROCHLORIDE 50 MG/ML
12.5 INJECTION, SOLUTION INTRAMUSCULAR; INTRAVENOUS
Status: ACTIVE | OUTPATIENT
Start: 2023-03-22 | End: 2023-03-23

## 2023-03-22 RX ORDER — FENTANYL CITRATE 50 UG/ML
25 INJECTION, SOLUTION INTRAMUSCULAR; INTRAVENOUS
Status: DISCONTINUED | OUTPATIENT
Start: 2023-03-22 | End: 2023-03-22 | Stop reason: HOSPADM

## 2023-03-22 RX ORDER — NEOSTIGMINE METHYLSULFATE 1 MG/ML
INJECTION, SOLUTION INTRAVENOUS AS NEEDED
Status: DISCONTINUED | OUTPATIENT
Start: 2023-03-22 | End: 2023-03-22 | Stop reason: HOSPADM

## 2023-03-22 RX ORDER — ROCURONIUM BROMIDE 10 MG/ML
INJECTION, SOLUTION INTRAVENOUS AS NEEDED
Status: DISCONTINUED | OUTPATIENT
Start: 2023-03-22 | End: 2023-03-22 | Stop reason: HOSPADM

## 2023-03-22 RX ORDER — POLYETHYLENE GLYCOL 3350 17 G/17G
17 POWDER, FOR SOLUTION ORAL DAILY
Status: DISCONTINUED | OUTPATIENT
Start: 2023-03-23 | End: 2023-03-24 | Stop reason: HOSPADM

## 2023-03-22 RX ORDER — LIDOCAINE HYDROCHLORIDE 20 MG/ML
INJECTION, SOLUTION EPIDURAL; INFILTRATION; INTRACAUDAL; PERINEURAL AS NEEDED
Status: DISCONTINUED | OUTPATIENT
Start: 2023-03-22 | End: 2023-03-22 | Stop reason: HOSPADM

## 2023-03-22 RX ORDER — DILTIAZEM HYDROCHLORIDE 240 MG/1
240 CAPSULE, COATED, EXTENDED RELEASE ORAL DAILY
Status: DISCONTINUED | OUTPATIENT
Start: 2023-03-23 | End: 2023-03-24 | Stop reason: HOSPADM

## 2023-03-22 RX ORDER — HYDROCORTISONE 1 %
CREAM (GRAM) TOPICAL 2 TIMES DAILY
Status: DISCONTINUED | OUTPATIENT
Start: 2023-03-22 | End: 2023-03-24 | Stop reason: HOSPADM

## 2023-03-22 RX ORDER — FACIAL-BODY WIPES
10 EACH TOPICAL DAILY PRN
Status: DISCONTINUED | OUTPATIENT
Start: 2023-03-24 | End: 2023-03-24 | Stop reason: HOSPADM

## 2023-03-22 RX ORDER — SODIUM CHLORIDE 9 MG/ML
INJECTION, SOLUTION INTRAVENOUS
Status: DISCONTINUED | OUTPATIENT
Start: 2023-03-22 | End: 2023-03-22 | Stop reason: HOSPADM

## 2023-03-22 RX ORDER — MIDAZOLAM HYDROCHLORIDE 1 MG/ML
1 INJECTION, SOLUTION INTRAMUSCULAR; INTRAVENOUS AS NEEDED
Status: DISCONTINUED | OUTPATIENT
Start: 2023-03-22 | End: 2023-03-22 | Stop reason: HOSPADM

## 2023-03-22 RX ORDER — MORPHINE SULFATE 2 MG/ML
2 INJECTION, SOLUTION INTRAMUSCULAR; INTRAVENOUS
Status: DISPENSED | OUTPATIENT
Start: 2023-03-22 | End: 2023-03-23

## 2023-03-22 RX ORDER — DEXAMETHASONE SODIUM PHOSPHATE 4 MG/ML
10 INJECTION, SOLUTION INTRA-ARTICULAR; INTRALESIONAL; INTRAMUSCULAR; INTRAVENOUS; SOFT TISSUE EVERY 6 HOURS
Status: COMPLETED | OUTPATIENT
Start: 2023-03-22 | End: 2023-03-23

## 2023-03-22 RX ORDER — FENTANYL CITRATE 50 UG/ML
INJECTION, SOLUTION INTRAMUSCULAR; INTRAVENOUS AS NEEDED
Status: DISCONTINUED | OUTPATIENT
Start: 2023-03-22 | End: 2023-03-22 | Stop reason: HOSPADM

## 2023-03-22 RX ORDER — SODIUM CHLORIDE 0.9 % (FLUSH) 0.9 %
5-40 SYRINGE (ML) INJECTION AS NEEDED
Status: DISCONTINUED | OUTPATIENT
Start: 2023-03-22 | End: 2023-03-22 | Stop reason: HOSPADM

## 2023-03-22 RX ORDER — TRAZODONE HYDROCHLORIDE 50 MG/1
50 TABLET ORAL
Status: DISCONTINUED | OUTPATIENT
Start: 2023-03-22 | End: 2023-03-24 | Stop reason: HOSPADM

## 2023-03-22 RX ORDER — AMOXICILLIN 250 MG
1 CAPSULE ORAL 2 TIMES DAILY
Status: DISCONTINUED | OUTPATIENT
Start: 2023-03-22 | End: 2023-03-24 | Stop reason: HOSPADM

## 2023-03-22 RX ORDER — SUCCINYLCHOLINE CHLORIDE 20 MG/ML
INJECTION INTRAMUSCULAR; INTRAVENOUS AS NEEDED
Status: DISCONTINUED | OUTPATIENT
Start: 2023-03-22 | End: 2023-03-22 | Stop reason: HOSPADM

## 2023-03-22 RX ORDER — GLYCOPYRROLATE 0.2 MG/ML
INJECTION INTRAMUSCULAR; INTRAVENOUS AS NEEDED
Status: DISCONTINUED | OUTPATIENT
Start: 2023-03-22 | End: 2023-03-22 | Stop reason: HOSPADM

## 2023-03-22 RX ORDER — SODIUM CHLORIDE, SODIUM LACTATE, POTASSIUM CHLORIDE, CALCIUM CHLORIDE 600; 310; 30; 20 MG/100ML; MG/100ML; MG/100ML; MG/100ML
INJECTION, SOLUTION INTRAVENOUS
Status: DISCONTINUED | OUTPATIENT
Start: 2023-03-22 | End: 2023-03-22 | Stop reason: HOSPADM

## 2023-03-22 RX ORDER — OXYCODONE HYDROCHLORIDE 5 MG/1
10 TABLET ORAL
Status: DISCONTINUED | OUTPATIENT
Start: 2023-03-22 | End: 2023-03-24 | Stop reason: HOSPADM

## 2023-03-22 RX ORDER — SODIUM CHLORIDE, SODIUM LACTATE, POTASSIUM CHLORIDE, CALCIUM CHLORIDE 600; 310; 30; 20 MG/100ML; MG/100ML; MG/100ML; MG/100ML
125 INJECTION, SOLUTION INTRAVENOUS CONTINUOUS
Status: DISCONTINUED | OUTPATIENT
Start: 2023-03-22 | End: 2023-03-22 | Stop reason: HOSPADM

## 2023-03-22 RX ORDER — MORPHINE SULFATE 2 MG/ML
2 INJECTION, SOLUTION INTRAMUSCULAR; INTRAVENOUS
Status: DISCONTINUED | OUTPATIENT
Start: 2023-03-22 | End: 2023-03-22 | Stop reason: HOSPADM

## 2023-03-22 RX ORDER — METOPROLOL SUCCINATE 50 MG/1
50 TABLET, EXTENDED RELEASE ORAL DAILY
Status: DISCONTINUED | OUTPATIENT
Start: 2023-03-23 | End: 2023-03-24 | Stop reason: HOSPADM

## 2023-03-22 RX ORDER — OXYCODONE HYDROCHLORIDE 5 MG/1
5 TABLET ORAL
Status: DISCONTINUED | OUTPATIENT
Start: 2023-03-22 | End: 2023-03-24 | Stop reason: HOSPADM

## 2023-03-22 RX ORDER — ONDANSETRON 2 MG/ML
INJECTION INTRAMUSCULAR; INTRAVENOUS AS NEEDED
Status: DISCONTINUED | OUTPATIENT
Start: 2023-03-22 | End: 2023-03-22 | Stop reason: HOSPADM

## 2023-03-22 RX ORDER — LIDOCAINE HYDROCHLORIDE 10 MG/ML
0.5 INJECTION, SOLUTION EPIDURAL; INFILTRATION; INTRACAUDAL; PERINEURAL AS NEEDED
Status: DISCONTINUED | OUTPATIENT
Start: 2023-03-22 | End: 2023-03-22 | Stop reason: HOSPADM

## 2023-03-22 RX ORDER — OXYCODONE HYDROCHLORIDE 5 MG/1
5 TABLET ORAL AS NEEDED
Status: DISCONTINUED | OUTPATIENT
Start: 2023-03-22 | End: 2023-03-22 | Stop reason: HOSPADM

## 2023-03-22 RX ORDER — PRAVASTATIN SODIUM 40 MG/1
40 TABLET ORAL
Status: DISCONTINUED | OUTPATIENT
Start: 2023-03-22 | End: 2023-03-24 | Stop reason: HOSPADM

## 2023-03-22 RX ORDER — ACETAMINOPHEN 325 MG/1
650 TABLET ORAL ONCE
Status: COMPLETED | OUTPATIENT
Start: 2023-03-22 | End: 2023-03-22

## 2023-03-22 RX ORDER — SODIUM CHLORIDE 0.9 % (FLUSH) 0.9 %
5-40 SYRINGE (ML) INJECTION EVERY 8 HOURS
Status: DISCONTINUED | OUTPATIENT
Start: 2023-03-22 | End: 2023-03-24 | Stop reason: HOSPADM

## 2023-03-22 RX ORDER — MELATONIN
50 DAILY
Status: DISCONTINUED | OUTPATIENT
Start: 2023-03-23 | End: 2023-03-24 | Stop reason: HOSPADM

## 2023-03-22 RX ORDER — TAMSULOSIN HYDROCHLORIDE 0.4 MG/1
0.4 CAPSULE ORAL DAILY
Status: DISCONTINUED | OUTPATIENT
Start: 2023-03-23 | End: 2023-03-24 | Stop reason: HOSPADM

## 2023-03-22 RX ORDER — DEXTROSE, SODIUM CHLORIDE, SODIUM LACTATE, POTASSIUM CHLORIDE, AND CALCIUM CHLORIDE 5; .6; .31; .03; .02 G/100ML; G/100ML; G/100ML; G/100ML; G/100ML
125 INJECTION, SOLUTION INTRAVENOUS CONTINUOUS
Status: DISCONTINUED | OUTPATIENT
Start: 2023-03-22 | End: 2023-03-22 | Stop reason: HOSPADM

## 2023-03-22 RX ORDER — SODIUM CHLORIDE 0.9 % (FLUSH) 0.9 %
5-40 SYRINGE (ML) INJECTION AS NEEDED
Status: DISCONTINUED | OUTPATIENT
Start: 2023-03-22 | End: 2023-03-24 | Stop reason: HOSPADM

## 2023-03-22 RX ORDER — ONDANSETRON 2 MG/ML
4 INJECTION INTRAMUSCULAR; INTRAVENOUS AS NEEDED
Status: DISCONTINUED | OUTPATIENT
Start: 2023-03-22 | End: 2023-03-22 | Stop reason: HOSPADM

## 2023-03-22 RX ORDER — DEXAMETHASONE SODIUM PHOSPHATE 4 MG/ML
INJECTION, SOLUTION INTRA-ARTICULAR; INTRALESIONAL; INTRAMUSCULAR; INTRAVENOUS; SOFT TISSUE AS NEEDED
Status: DISCONTINUED | OUTPATIENT
Start: 2023-03-22 | End: 2023-03-22 | Stop reason: HOSPADM

## 2023-03-22 RX ORDER — ACETAMINOPHEN 500 MG
1000 TABLET ORAL EVERY 6 HOURS
Status: DISCONTINUED | OUTPATIENT
Start: 2023-03-22 | End: 2023-03-24 | Stop reason: HOSPADM

## 2023-03-22 RX ORDER — GLYCOPYRROLATE 0.2 MG/ML
0.2 INJECTION INTRAMUSCULAR; INTRAVENOUS ONCE
Status: COMPLETED | OUTPATIENT
Start: 2023-03-22 | End: 2023-03-22

## 2023-03-22 RX ORDER — PROPOFOL 10 MG/ML
INJECTION, EMULSION INTRAVENOUS AS NEEDED
Status: DISCONTINUED | OUTPATIENT
Start: 2023-03-22 | End: 2023-03-22 | Stop reason: HOSPADM

## 2023-03-22 RX ORDER — NALOXONE HYDROCHLORIDE 0.4 MG/ML
0.4 INJECTION, SOLUTION INTRAMUSCULAR; INTRAVENOUS; SUBCUTANEOUS AS NEEDED
Status: DISCONTINUED | OUTPATIENT
Start: 2023-03-22 | End: 2023-03-24 | Stop reason: HOSPADM

## 2023-03-22 RX ORDER — PHENYLEPHRINE HCL IN 0.9% NACL 0.4MG/10ML
SYRINGE (ML) INTRAVENOUS
Status: DISCONTINUED | OUTPATIENT
Start: 2023-03-22 | End: 2023-03-22

## 2023-03-22 RX ORDER — SODIUM CHLORIDE 9 MG/ML
125 INJECTION, SOLUTION INTRAVENOUS CONTINUOUS
Status: DISPENSED | OUTPATIENT
Start: 2023-03-22 | End: 2023-03-23

## 2023-03-22 RX ORDER — HYDRALAZINE HYDROCHLORIDE 50 MG/1
50 TABLET, FILM COATED ORAL 2 TIMES DAILY
Status: DISCONTINUED | OUTPATIENT
Start: 2023-03-22 | End: 2023-03-24 | Stop reason: HOSPADM

## 2023-03-22 RX ORDER — THERA TABS 400 MCG
1 TAB ORAL DAILY
Status: DISCONTINUED | OUTPATIENT
Start: 2023-03-23 | End: 2023-03-24 | Stop reason: HOSPADM

## 2023-03-22 RX ORDER — ROPIVACAINE HYDROCHLORIDE 5 MG/ML
30 INJECTION, SOLUTION EPIDURAL; INFILTRATION; PERINEURAL AS NEEDED
Status: DISCONTINUED | OUTPATIENT
Start: 2023-03-22 | End: 2023-03-22 | Stop reason: HOSPADM

## 2023-03-22 RX ORDER — GLYCOPYRROLATE 0.2 MG/ML
INJECTION INTRAMUSCULAR; INTRAVENOUS
Status: DISPENSED
Start: 2023-03-22 | End: 2023-03-22

## 2023-03-22 RX ORDER — DIPHENHYDRAMINE HYDROCHLORIDE 50 MG/ML
12.5 INJECTION, SOLUTION INTRAMUSCULAR; INTRAVENOUS AS NEEDED
Status: DISCONTINUED | OUTPATIENT
Start: 2023-03-22 | End: 2023-03-22 | Stop reason: HOSPADM

## 2023-03-22 RX ORDER — CYCLOBENZAPRINE HCL 10 MG
10 TABLET ORAL
Status: DISCONTINUED | OUTPATIENT
Start: 2023-03-22 | End: 2023-03-24 | Stop reason: HOSPADM

## 2023-03-22 RX ORDER — ASPIRIN 81 MG/1
81 TABLET ORAL DAILY
Status: DISCONTINUED | OUTPATIENT
Start: 2023-03-23 | End: 2023-03-24 | Stop reason: HOSPADM

## 2023-03-22 RX ADMIN — Medication 1 LOZENGE: at 22:51

## 2023-03-22 RX ADMIN — DEXAMETHASONE SODIUM PHOSPHATE 4 MG: 4 INJECTION, SOLUTION INTRAMUSCULAR; INTRAVENOUS at 07:44

## 2023-03-22 RX ADMIN — DOCUSATE SODIUM 50MG AND SENNOSIDES 8.6MG 1 TABLET: 8.6; 5 TABLET, FILM COATED ORAL at 13:01

## 2023-03-22 RX ADMIN — LIDOCAINE HYDROCHLORIDE 100 MG: 20 INJECTION, SOLUTION EPIDURAL; INFILTRATION; INTRACAUDAL; PERINEURAL at 07:35

## 2023-03-22 RX ADMIN — DEXAMETHASONE SODIUM PHOSPHATE 6 MG: 4 INJECTION, SOLUTION INTRAMUSCULAR; INTRAVENOUS at 08:27

## 2023-03-22 RX ADMIN — SODIUM CHLORIDE 125 ML/HR: 9 INJECTION, SOLUTION INTRAVENOUS at 13:00

## 2023-03-22 RX ADMIN — SODIUM CHLORIDE, POTASSIUM CHLORIDE, SODIUM LACTATE AND CALCIUM CHLORIDE 125 ML/HR: 600; 310; 30; 20 INJECTION, SOLUTION INTRAVENOUS at 06:40

## 2023-03-22 RX ADMIN — GLYCOPYRROLATE 0.6 MG: 0.2 INJECTION INTRAMUSCULAR; INTRAVENOUS at 09:45

## 2023-03-22 RX ADMIN — OXYCODONE HYDROCHLORIDE 10 MG: 5 TABLET ORAL at 14:38

## 2023-03-22 RX ADMIN — GLYCOPYRROLATE 0.2 MG: 0.2 INJECTION INTRAMUSCULAR; INTRAVENOUS at 10:31

## 2023-03-22 RX ADMIN — OXYCODONE HYDROCHLORIDE 5 MG: 5 TABLET ORAL at 22:39

## 2023-03-22 RX ADMIN — ROCURONIUM BROMIDE 10 MG: 10 SOLUTION INTRAVENOUS at 07:35

## 2023-03-22 RX ADMIN — GLYCOPYRROLATE 0.2 MG: 0.2 INJECTION INTRAMUSCULAR; INTRAVENOUS at 08:04

## 2023-03-22 RX ADMIN — ACETAMINOPHEN 1000 MG: 500 TABLET ORAL at 13:00

## 2023-03-22 RX ADMIN — PROPOFOL 50 MCG/KG/MIN: 10 INJECTION, EMULSION INTRAVENOUS at 07:43

## 2023-03-22 RX ADMIN — ACETAMINOPHEN 650 MG: 325 TABLET ORAL at 06:42

## 2023-03-22 RX ADMIN — Medication 80 MCG: at 08:06

## 2023-03-22 RX ADMIN — SODIUM CHLORIDE, PRESERVATIVE FREE 10 ML: 5 INJECTION INTRAVENOUS at 14:00

## 2023-03-22 RX ADMIN — PROPOFOL 100 MCG/KG/MIN: 10 INJECTION, EMULSION INTRAVENOUS at 09:20

## 2023-03-22 RX ADMIN — NEOSTIGMINE METHYLSULFATE 3 MG: 1 INJECTION, SOLUTION INTRAVENOUS at 09:45

## 2023-03-22 RX ADMIN — Medication: at 18:24

## 2023-03-22 RX ADMIN — ROCURONIUM BROMIDE 25 MG: 10 SOLUTION INTRAVENOUS at 08:05

## 2023-03-22 RX ADMIN — SODIUM CHLORIDE, POTASSIUM CHLORIDE, SODIUM LACTATE AND CALCIUM CHLORIDE: 600; 310; 30; 20 INJECTION, SOLUTION INTRAVENOUS at 07:11

## 2023-03-22 RX ADMIN — PROPOFOL 40 MG: 10 INJECTION, EMULSION INTRAVENOUS at 09:33

## 2023-03-22 RX ADMIN — Medication 10 MG/HR: at 07:58

## 2023-03-22 RX ADMIN — FENTANYL CITRATE 50 MCG: 50 INJECTION, SOLUTION INTRAMUSCULAR; INTRAVENOUS at 08:52

## 2023-03-22 RX ADMIN — HYDRALAZINE HYDROCHLORIDE 50 MG: 50 TABLET, FILM COATED ORAL at 17:08

## 2023-03-22 RX ADMIN — CEFAZOLIN 2 G: 1 INJECTION, POWDER, FOR SOLUTION INTRAMUSCULAR; INTRAVENOUS at 22:39

## 2023-03-22 RX ADMIN — Medication 80 MCG: at 07:56

## 2023-03-22 RX ADMIN — Medication 80 MCG: at 07:37

## 2023-03-22 RX ADMIN — CEFAZOLIN 2 G: 1 INJECTION, POWDER, FOR SOLUTION INTRAMUSCULAR; INTRAVENOUS at 14:32

## 2023-03-22 RX ADMIN — SODIUM CHLORIDE, PRESERVATIVE FREE 10 ML: 5 INJECTION INTRAVENOUS at 22:39

## 2023-03-22 RX ADMIN — SUCCINYLCHOLINE CHLORIDE 200 MG: 20 INJECTION, SOLUTION INTRAMUSCULAR; INTRAVENOUS at 07:35

## 2023-03-22 RX ADMIN — Medication 80 MCG: at 07:47

## 2023-03-22 RX ADMIN — FENTANYL CITRATE 50 MCG: 50 INJECTION, SOLUTION INTRAMUSCULAR; INTRAVENOUS at 07:30

## 2023-03-22 RX ADMIN — OXYCODONE HYDROCHLORIDE 5 MG: 5 TABLET ORAL at 17:08

## 2023-03-22 RX ADMIN — TRAZODONE HYDROCHLORIDE 50 MG: 100 TABLET ORAL at 22:39

## 2023-03-22 RX ADMIN — ONDANSETRON HYDROCHLORIDE 4 MG: 2 INJECTION, SOLUTION INTRAMUSCULAR; INTRAVENOUS at 09:39

## 2023-03-22 RX ADMIN — DEXAMETHASONE SODIUM PHOSPHATE 10 MG: 4 INJECTION, SOLUTION INTRAMUSCULAR; INTRAVENOUS at 13:00

## 2023-03-22 RX ADMIN — PRAVASTATIN SODIUM 40 MG: 40 TABLET ORAL at 22:39

## 2023-03-22 RX ADMIN — PROPOFOL 150 MG: 10 INJECTION, EMULSION INTRAVENOUS at 07:35

## 2023-03-22 RX ADMIN — PHENYLEPHRINE HYDROCHLORIDE 80 MCG/MIN: 10 INJECTION INTRAVENOUS at 07:37

## 2023-03-22 RX ADMIN — ACETAMINOPHEN 1000 MG: 500 TABLET ORAL at 17:08

## 2023-03-22 RX ADMIN — WATER 2 G: 1 INJECTION INTRAMUSCULAR; INTRAVENOUS; SUBCUTANEOUS at 08:03

## 2023-03-22 RX ADMIN — DOCUSATE SODIUM 50MG AND SENNOSIDES 8.6MG 1 TABLET: 8.6; 5 TABLET, FILM COATED ORAL at 17:14

## 2023-03-22 RX ADMIN — DEXAMETHASONE SODIUM PHOSPHATE 10 MG: 4 INJECTION, SOLUTION INTRAMUSCULAR; INTRAVENOUS at 17:08

## 2023-03-22 RX ADMIN — SODIUM CHLORIDE: 900 INJECTION, SOLUTION INTRAVENOUS at 07:44

## 2023-03-22 RX ADMIN — MORPHINE SULFATE 2 MG: 2 INJECTION, SOLUTION INTRAMUSCULAR; INTRAVENOUS at 13:00

## 2023-03-22 NOTE — PERIOP NOTES
Patient's Hr 47-50 and /67. Dr. Soren Eden notified and made aware that his EKG on March 15, 2023 HR was 53. Soren Layne stated that's good and he can be transferred to his room.

## 2023-03-22 NOTE — PERIOP NOTES
Patient's HR in the 40s and BP 80s/60. Dr. Kathyrn Rubinstein notified and Robinul 0.2 mg IV ordered and given. Will continue to monitor.

## 2023-03-22 NOTE — PROGRESS NOTES
Physical Therapy 3/22/2023    Orders received and chart reviewed up to date. Pt POD 0 ACDF. Will follow-up tomorrow for PT evaluation/ as appropriate. Thank you.   Royal Wadlron, PT, DPT

## 2023-03-22 NOTE — ANESTHESIA PREPROCEDURE EVALUATION
Relevant Problems   No relevant active problems       Anesthetic History   No history of anesthetic complications            Review of Systems / Medical History  Patient summary reviewed, nursing notes reviewed and pertinent labs reviewed    Pulmonary  Within defined limits                 Neuro/Psych   Within defined limits           Cardiovascular  Within defined limits  Hypertension                   GI/Hepatic/Renal  Within defined limits   GERD    Renal disease: CRI       Endo/Other  Within defined limits      Arthritis     Other Findings              Physical Exam    Airway  Mallampati: II  TM Distance: > 6 cm  Neck ROM: normal range of motion   Mouth opening: Normal     Cardiovascular  Regular rate and rhythm,  S1 and S2 normal,  no murmur, click, rub, or gallop             Dental  No notable dental hx       Pulmonary  Breath sounds clear to auscultation               Abdominal  GI exam deferred       Other Findings            Anesthetic Plan    ASA: 3  Anesthesia type: general          Induction: Intravenous  Anesthetic plan and risks discussed with: Patient

## 2023-03-22 NOTE — BRIEF OP NOTE
Brief Postoperative Note    Patient: Bee Naidu  YOB: 1945  MRN: [id number]    Date of Procedure: 3/22/2023     Pre-Op Diagnosis: Neck pain [M54.2]  Cervical radiculopathy [M54.12]  Cervical spinal stenosis [M48.02]  Cervical disc disease [M50.90]    Post-Op Diagnosis: Same as preoperative diagnosis. Procedure(s):  ANTERIOR CERVICAL DISCECTOMY WITH FUSION C3-C7    Surgeon(s):  Fernando Olivo MD    Surgical Assistant: Physician Assistant: LAUREEN Garcia    Anesthesia: General     Estimated Blood Loss (mL): Minimal    Complications: None    Specimens: * No specimens in log *     Implants:   Implant Name Type Inv.  Item Serial No.  Lot No. LRB No. Used Action   PUTTY BONE GRAFT FACTOR 2.5ML PEPTIDE ENHANCED W/SYRINGE - SNA  PUTTY BONE GRAFT FACTOR 2.5ML PEPTIDE ENHANCED W/SYRINGE NA [employer]_ [id number] N/A 1 Implanted   GRAFT BNE SUB SM CANC FRZN MORSELIZED W/ VIABLE CELL - [id number]  GRAFT BNE SUB SM CANC FRZN MORSELIZED W/ VIABLE CELL [id number] [employer]_ N/A N/A 1 Implanted   GRAFT BNE SUB SM CANC FRZN MORSELIZED W/ VIABLE CELL - [id number]  GRAFT BNE SUB SM CANC FRZN MORSELIZED W/ VIABLE CELL [id number] [employer]_ N/A N/A 1 Implanted   GRAFT HUM TISS 3X4 CM WND COVERING AMNIO MEMBRN VERSASHIELD - [id number]  GRAFT HUM TISS 3X4 CM WND COVERING AMNIO MEMBRN VERSASHIELD [id number] [employer]_ N/A N/A 1 Implanted   CAGE SPNL 6 DEG MED [id number] MM ENDOSKELETON TC [place]AN[place]OCK - SN/A  CAGE SPNL 6 DEG MED [id number] MM ENDOSKELETON TC NAN[place]OCK N/A [employer] DANEK_ [id number] N/A 1 Implanted   CAGE SPNL 6 DEG MED [id number] MM ENDOSKELETON TC [place]OCK - SN/A  CAGE SPNL 6 DEG MED [id number] MM ENDOSKELETON TC NAN[place]OCK N/A MEDTR[employer]IC SOFAMOR DANEK_WD [id number] N/A 3 Implanted   PLATE SPNL [id number] TI ANT CERV QTR TURN CHRISTI MECHANISM TEMP - SNA PLATE SPNL [id number] TI ANT CERV QTR TURN CHRISTI MECHANISM TEMP NA MEDTR[employer]IC SOFAMOR DANEK_WD NA N/A 1 Implanted   SCR SPNE VA SD 4X16MM --  - SNA  SCR SPNE VA SD 4X16MM --  NA [employer]TR[employer] SOFAMOR DANEK_WD NA N/A 10 Implanted       Drains:   Zohaib-Nguyen Drain 03/22/23 Anterior; Left Neck (Active)       Findings: DDD    Electronically Signed by LAUREEN Ellsworth on 3/22/2023 at 9:58 AM

## 2023-03-22 NOTE — PROGRESS NOTES
Spine Surgery Progress Note    Admit Date: 3/22/2023   LOS: 0 days      Daily Progress Note: 3/22/2023    POD:Day of Surgery    S/P: Procedure(s):  ANTERIOR CERVICAL DISCECTOMY WITH FUSION C3-C7    Visit Vitals  /77   Pulse (!) 56   Temp 97.9 °F (36.6 °C)   Resp 15   Ht 6' (1.829 m)   Wt 98.9 kg (218 lb)   SpO2 94%   BMI 29.57 kg/m²      Lab Results   Component Value Date/Time    HGB 13.2 03/15/2023 10:45 AM    INR 1.0 03/15/2023 10:45 AM       Patient doing well overall. No nausea or vomiting. Still a little drowsy from surgery. Pain well controlled on current medications. Complaints of itching in his back and flanks; pt states he has eczema and would like some hydrocortisone cream  He has not voided since surgery  Swallowing pills and liquid without issue. Denies nausea, vomiting, fever, chills, chest pain, dyspnea, headache. Calves soft/NTTP Bilaterally. DENNEY spontaneously and equally. 5/5  strength bilat  Neurocirculatory exam WNL. Motor and sensation intact. Incision OK; no drainage. Dressing clean and dry. JANESSA drain intact and functioning    Plan:  -Pain control - scheduled tylenol; PRN oxycodone, flexeril  -PT/OT; in soft collar  -Monitor JANESSA drain output q 8hr  -Bladder checks; straight cath PRN  -Decadron 10mg q 6hr x 4 doses  -ADAT  -Bowel regimen  -Cont home meds as ordered  -Daily dressing changes to incision  [facility]     Discharge pending. All questions were answered. Follow up in Dr. Soila Sanchez office in 2 weeks, sooner if needed.     LAUREEN Paige

## 2023-03-22 NOTE — OP NOTES
[facility]  OPERATIVE REPORT    Name:  Puja Gilbert  MR#:  [id number]  :  1945  ACCOUNT #:  [de-identified]  DATE OF SERVICE:  2023    PREOPERATIVE DIAGNOSIS:  Cervical spondylosis C3-4, C4-5, C5-6, C6-7 with cervical stenosis. POSTOPERATIVE DIAGNOSIS:  Cervical spondylosis C3-4, C4-5, C5-6, C6-7 with cervical stenosis. PROCEDURE PERFORMED:  Anterior cervical diskectomy C3-4, C4-5, C5-6, C6-7; anterior interbody fusion C3-4, C4-5, C5-6, C6-7; anterior plate fixation C3, C4, C5, C6, and C7. SURGEON:  Lito Frey MD    ASSISTANT:  Maggie Curiel PA-C    ANESTHESIA:  General anesthesia. COMPLICATIONS:  No complications. SPECIMENS REMOVED:  None. IMPLANTS:  Instrumentation includes Medtronic Elite anterior cervical plating system and Medtronic Titan TC interbody grafting system. ESTIMATED BLOOD LOSS:  Minimlal.    DRAINS:  One #7 flat JANESSA drain. INDICATIONS:  This is a pleasant 44-year-old gentleman with chronic history of neck pain and bilateral arm pain. Spondylosis from C3 down to C7 with stenosis particularly at C3-4 and C6-7. Worsening neck pain with signs of myelopathy. The patient is for anterior cervical decompression and fusion. PROCEDURE:  The patient was identified, brought to the operative suite, underwent general anesthesia without difficulty. Preoperative neuromonitoring was placed and baselines obtained and these remained stable throughout the surgical procedure. Neck was prepped and draped sterilely. 10 pounds of traction across the head and neck with chin strap in the neutral position. Neck was prepped and draped sterilely. Time-out confirmed. A transverse incision was made to just inferior portion of the thyroid cartilage. Dissection down to the platysma. This was split longitudinally. Blunt dissection medial to the sternocleidomastoid.   We exposed above and below the omohyoid muscle and then bluntly dissected down to the deep cervical fascia. We confirmed the appropriate level, the most cephalad level of C3-4. Longus colli were elevated. Anterior osteophytes were removed. Deep radiolucent retractors and we started working from the C6-7 level proximally. [place] pins were placed for distraction. Annulotomy followed by complete diskectomy with removal of disk material with pituitary rongeurs and curved curettes. Distracting disk space back out to normal height. Posterior vertebral osteophytes taken down with loupe magnification and with Midas bur. Posterior disk space was accessed and identified. The disk was removed without difficulty. Undercut the vertebral osteophytes for further central canal decompression. Posterior longitudinal ligament elevated with a nerve hook and resected. At which time, bilateral foraminotomies were performed with undercutting the uncinate processes for decompression of the foramen with #1 and #2 Kerrison until a blunt nerve hook could pass in the foramen. We continued to do this sequentially up to C5-6 level followed by the C4-5 level and then eventually C3-4 level, removing the retractors each time. We did complete central decompression at each level, doing the annulotomy as well as removing the disk material with distraction back up to normal disk height. Posterior longitudinal ligament was taken down at each level. Posterior vertebral osteophytes were undercut for central canal decompression. Neuromonitoring was stable and remained stable after decompression of the remainder of the C5-6, C4-5, and C3-4 levels. After adequate decompression of all four levels and then bilateral foraminal decompression, we then did trial spacers at each level. We used a 16 x 14 mm trial footprint, 8 mm height at C6-7 and then used 7 mm height at the remaining C3-4, C4-5, and C5-6 levels. Endplates were rasped to light bleeding bone. The titanium grafts were impacted with Liyah allograft and I-FACTOR. These were impacted in place with countersinking. Neuromonitoring was stable after placement of each graft. We placed grafts at C3-4, C4-5, C5-6, and C6-7. Weight was removed off the head. Neuromonitoring was stable. We then contoured a 77.5 mm anterior cervical plate. This was a Medtronic Elite plate to the anterior cervical spine. Temporary fixation with threaded pin followed by 4 x 15 mm screws at each level with good fixation obtained. Locking mechanisms engaged at each level. The wound was thoroughly irrigated. FloSeal for hemostasis. VersaShield for anti-adhesion. #7 flat JANESSA drain placed. 3-0 Monocryl on the subcutaneous layer. Soft collar placed. The patient returned to the PACU in stable condition. Ky Suarez PAC was present for the entirety of the procedure and vital for the performance of the procedure. Ky Suarez [facility] assisted with positioning, prepping, draping of the patient before the procedure and instrument manipulation/placement, spinal repositioning and fluoroscopic operation during the procedure as well as wound closure, dressing application and brace application after the procedure.  Please note that no intern, resident, or other hospital staff was available to assist during the surgery     Rayne Beyer MD      [id number]/  D:  03/22/2023 9:49  T:  03/22/2023 16:05  JOB #:  [id number]

## 2023-03-22 NOTE — PERIOP NOTES
TRANSFER - OUT REPORT:    Verbal report given to 5 W RN(name) on Francois Russ  being transferred to 546(unit) for routine post - op       Report consisted of patients Situation, Background, Assessment and   Recommendations(SBAR). Time Pre op antibiotic given:0805  Anesthesia Stop time: 9622    Information from the following report(s) SBAR, Procedure Summary, Intake/Output, and MAR was reviewed with the receiving nurse. Opportunity for questions and clarification was provided. Is the patient on 02? YES       L/Min 2         Is the patient on a monitor? NO    Is the nurse transporting with the patient? NO    Surgical Waiting Area notified of patient's transfer from PACU? YES      The following personal items collected during your admission accompanied patient upon transfer:   Dental Appliance: Dental Appliances: None  Vision: Visual Aid: None  Hearing Aid:    Jewelry: Jewelry: None  Clothing: Clothing: Shirt, Footwear, Pants, Undergarments sent to 546  Other Valuables:  Other Valuables: None  Valuables sent to safe:

## 2023-03-22 NOTE — PERIOP NOTES
MD and anesthesia notified of small dime size biopsy on patient back, it is flat and no drainage noted, pink in color, this was done at [address] [facility]. This was done on 03/21/2023. Patient has no complaints of this site other than it itches.

## 2023-03-22 NOTE — PROGRESS NOTES
Transition of Care Plan  [facility]- Outpatient   DISPOSITION: The disposition plan is home with family assistance; pending medical progression  Pending PT/OT recs pending    F/U with PCP/Specialist    Transport: Family   Barrier: PT Clearance, Medical       Care Management Interventions  PCP Verified by CM: Yes  Mode of Transport at Discharge:  Other (see comment)  Transition of Care Consult (CM Consult): Discharge Planning  MyChart Signup: No  Physical Therapy Consult: Yes  Occupational Therapy Consult: Yes  Speech Therapy Consult: No  Support Systems: Spouse/Significant Other, Other Family Member(s)  The Patient and/or Patient Representative was Provided with a Choice of Provider and Agrees with the Discharge Plan?: Yes  Freedom of Choice List was Provided with Basic Dialogue that Supports the Patient's Individualized Plan of Care/Goals, Treatment Preferences and Shares the Quality Data Associated with the Providers?: Yes  Discharge Location  Patient Expects to be Discharged to[de-identified] Home with family assistance    CM: [address]. [place],   [phone]

## 2023-03-22 NOTE — ANESTHESIA POSTPROCEDURE EVALUATION
Procedure(s):  ANTERIOR CERVICAL DISCECTOMY WITH FUSION C3-C7. general    Anesthesia Post Evaluation        Patient location during evaluation: PACU  Patient participation: complete - patient participated  Level of consciousness: awake and alert  Pain management: adequate  Airway patency: patent  Anesthetic complications: no  Cardiovascular status: acceptable  Respiratory status: acceptable  Hydration status: acceptable  Comments: I have seen and evaluated the patient and is ready for discharge. Naa Trujillo MD    Post anesthesia nausea and vomiting:  none      INITIAL Post-op Vital signs:   Vitals Value Taken Time   BP 90/65 03/22/23 1035   Temp 36.4 °C (97.6 °F) 03/22/23 1016   Pulse 49 03/22/23 1036   Resp 0 03/22/23 1036   SpO2 97 % 03/22/23 1036   Vitals shown include unvalidated device data.

## 2023-03-23 ENCOUNTER — APPOINTMENT (OUTPATIENT)
Dept: GENERAL RADIOLOGY | Age: 78
End: 2023-03-23
Attending: PHYSICIAN ASSISTANT
Payer: MEDICARE

## 2023-03-23 PROCEDURE — 74018 RADEX ABDOMEN 1 VIEW: CPT

## 2023-03-23 PROCEDURE — 97535 SELF CARE MNGMENT TRAINING: CPT

## 2023-03-23 PROCEDURE — 97161 PT EVAL LOW COMPLEX 20 MIN: CPT

## 2023-03-23 PROCEDURE — 74011250637 HC RX REV CODE- 250/637: Performed by: PHYSICIAN ASSISTANT

## 2023-03-23 PROCEDURE — 74011250636 HC RX REV CODE- 250/636: Performed by: STUDENT IN AN ORGANIZED HEALTH CARE EDUCATION/TRAINING PROGRAM

## 2023-03-23 PROCEDURE — 51798 US URINE CAPACITY MEASURE: CPT

## 2023-03-23 PROCEDURE — 97165 OT EVAL LOW COMPLEX 30 MIN: CPT

## 2023-03-23 PROCEDURE — 74011250637 HC RX REV CODE- 250/637: Performed by: STUDENT IN AN ORGANIZED HEALTH CARE EDUCATION/TRAINING PROGRAM

## 2023-03-23 PROCEDURE — 97116 GAIT TRAINING THERAPY: CPT

## 2023-03-23 PROCEDURE — 74011000250 HC RX REV CODE- 250: Performed by: STUDENT IN AN ORGANIZED HEALTH CARE EDUCATION/TRAINING PROGRAM

## 2023-03-23 PROCEDURE — 97530 THERAPEUTIC ACTIVITIES: CPT

## 2023-03-23 RX ORDER — OXYCODONE HYDROCHLORIDE 5 MG/1
5 TABLET ORAL
Qty: 40 TABLET | Refills: 0 | Status: SHIPPED | OUTPATIENT
Start: 2023-03-23 | End: 2023-04-06

## 2023-03-23 RX ORDER — OXYCODONE HYDROCHLORIDE 5 MG/1
5-10 TABLET ORAL
Qty: 60 TABLET | Refills: 0 | Status: SHIPPED | OUTPATIENT
Start: 2023-03-23 | End: 2023-03-23 | Stop reason: SDUPTHER

## 2023-03-23 RX ORDER — CALCIUM CARBONATE 200(500)MG
200 TABLET,CHEWABLE ORAL
Status: DISCONTINUED | OUTPATIENT
Start: 2023-03-23 | End: 2023-03-24 | Stop reason: HOSPADM

## 2023-03-23 RX ORDER — NALOXONE HYDROCHLORIDE 4 MG/.1ML
SPRAY NASAL
Qty: 1 EACH | Refills: 0 | Status: SHIPPED | OUTPATIENT
Start: 2023-03-23

## 2023-03-23 RX ADMIN — DILTIAZEM HYDROCHLORIDE 240 MG: 240 CAPSULE, COATED, EXTENDED RELEASE ORAL at 08:55

## 2023-03-23 RX ADMIN — HYDRALAZINE HYDROCHLORIDE 50 MG: 50 TABLET, FILM COATED ORAL at 17:10

## 2023-03-23 RX ADMIN — DEXAMETHASONE SODIUM PHOSPHATE 10 MG: 4 INJECTION, SOLUTION INTRAMUSCULAR; INTRAVENOUS at 00:05

## 2023-03-23 RX ADMIN — HYDRALAZINE HYDROCHLORIDE 50 MG: 50 TABLET, FILM COATED ORAL at 09:00

## 2023-03-23 RX ADMIN — CALCIUM CARBONATE (ANTACID) CHEW TAB 500 MG 200 MG: 500 CHEW TAB at 17:10

## 2023-03-23 RX ADMIN — DEXAMETHASONE SODIUM PHOSPHATE 10 MG: 4 INJECTION, SOLUTION INTRAMUSCULAR; INTRAVENOUS at 06:46

## 2023-03-23 RX ADMIN — OXYCODONE HYDROCHLORIDE 5 MG: 5 TABLET ORAL at 13:15

## 2023-03-23 RX ADMIN — ASPIRIN 81 MG: 81 TABLET, COATED ORAL at 08:13

## 2023-03-23 RX ADMIN — THERA TABS 1 TABLET: TAB at 08:14

## 2023-03-23 RX ADMIN — SODIUM CHLORIDE, PRESERVATIVE FREE 10 ML: 5 INJECTION INTRAVENOUS at 06:46

## 2023-03-23 RX ADMIN — ACETAMINOPHEN 1000 MG: 500 TABLET ORAL at 00:05

## 2023-03-23 RX ADMIN — OXYCODONE HYDROCHLORIDE 5 MG: 5 TABLET ORAL at 17:12

## 2023-03-23 RX ADMIN — OXYCODONE HYDROCHLORIDE 5 MG: 5 TABLET ORAL at 20:45

## 2023-03-23 RX ADMIN — SODIUM CHLORIDE, PRESERVATIVE FREE 10 ML: 5 INJECTION INTRAVENOUS at 00:08

## 2023-03-23 RX ADMIN — PANTOPRAZOLE SODIUM 40 MG: 40 TABLET, DELAYED RELEASE ORAL at 06:46

## 2023-03-23 RX ADMIN — SODIUM CHLORIDE, PRESERVATIVE FREE 10 ML: 5 INJECTION INTRAVENOUS at 22:28

## 2023-03-23 RX ADMIN — DOCUSATE SODIUM 50MG AND SENNOSIDES 8.6MG 1 TABLET: 8.6; 5 TABLET, FILM COATED ORAL at 17:10

## 2023-03-23 RX ADMIN — Medication 2000 UNITS: at 08:13

## 2023-03-23 RX ADMIN — METOPROLOL SUCCINATE 50 MG: 50 TABLET, EXTENDED RELEASE ORAL at 08:14

## 2023-03-23 RX ADMIN — Medication 1 LOZENGE: at 07:34

## 2023-03-23 RX ADMIN — Medication: at 13:16

## 2023-03-23 RX ADMIN — ACETAMINOPHEN 1000 MG: 500 TABLET ORAL at 06:46

## 2023-03-23 RX ADMIN — SODIUM CHLORIDE, PRESERVATIVE FREE 10 ML: 5 INJECTION INTRAVENOUS at 13:17

## 2023-03-23 RX ADMIN — TAMSULOSIN HYDROCHLORIDE 0.4 MG: 0.4 CAPSULE ORAL at 08:13

## 2023-03-23 RX ADMIN — Medication 1 LOZENGE: at 14:59

## 2023-03-23 RX ADMIN — Medication: at 18:50

## 2023-03-23 RX ADMIN — OXYCODONE HYDROCHLORIDE 5 MG: 5 TABLET ORAL at 08:15

## 2023-03-23 RX ADMIN — POLYETHYLENE GLYCOL 3350 17 G: 17 POWDER, FOR SOLUTION ORAL at 08:14

## 2023-03-23 RX ADMIN — PRAVASTATIN SODIUM 40 MG: 40 TABLET ORAL at 22:28

## 2023-03-23 RX ADMIN — Medication 1 TABLET: at 08:13

## 2023-03-23 RX ADMIN — TRAZODONE HYDROCHLORIDE 50 MG: 100 TABLET ORAL at 22:28

## 2023-03-23 RX ADMIN — ACETAMINOPHEN 1000 MG: 500 TABLET ORAL at 13:15

## 2023-03-23 RX ADMIN — OXYCODONE HYDROCHLORIDE 5 MG: 5 TABLET ORAL at 02:52

## 2023-03-23 RX ADMIN — DOCUSATE SODIUM 50MG AND SENNOSIDES 8.6MG 1 TABLET: 8.6; 5 TABLET, FILM COATED ORAL at 08:13

## 2023-03-23 NOTE — PROGRESS NOTES
Problem: Self Care Deficits Care Plan (Adult)  Goal: *Acute Goals and Plan of Care (Insert Text)  Description: FUNCTIONAL STATUS PRIOR TO ADMISSION: Patient was modified independent using a single point cane on occasion for functional mobility and modified ind for ADLs, driving. HOME SUPPORT: The patient lived with wife but did not require assist, she has recently had a similar surgery. Occupational Therapy Goals  Initiated 3/23/2023    1. Patient will perform lower body dressing with supervision/set-up using within 3 days. 2.  Patient will perform upper body dressing with supervision/set-up within 3 days. 3.  Patient will standing ADLs 5 mins at supervision/set-up within 3 days. 4.  Patient will don/doff neck brace at supervision/set-up within 3 days. 5.  Patient will verbalize/demonstrate 3/3 cervical precautions during ADL tasks without cues within 3 days. Outcome: Not Met    OCCUPATIONAL THERAPY EVALUATION  Patient: Etelvina Oro (89 y.o. male)  Date: 3/23/2023  Primary Diagnosis: Neck pain [M54.2]  Cervical radiculopathy [M54.12]  Cervical spinal stenosis [M48.02]  Cervical disc disease [M50.90]  Procedure(s) (LRB):  ANTERIOR CERVICAL DISCECTOMY WITH FUSION C3-C7 (N/A) 1 Day Post-Op   Precautions: fall  Spinal (soft collar)    ASSESSMENT  Based on the objective data described below, the patient presents with decreased stamina, balance and endurance secondary to above sx and precautions for his recovery. He is AAOx4, min A to set/up needed for ADLs and functional mobility without DME (see PT for further recs). The patient was provided education on precautions and help for ADLs during his recovery. He verbalized understanding but benefited from minimal cues throughout the session to recall. The patient will need education on AE for LB dressing prior to DC or follow up with [address] [place] [address]. Acute care OT will continue to follow, recommend HHOT v none depending on progress.  If PT clears patient today he will need HHOT. Current Level of Function Impacting Discharge (ADLs/self-care): min A    Functional Outcome Measure: The patient scored 20/24 on the AM PAC outcome measure which is indicative of minimal impairment. Other factors to consider for discharge: supportive family      Patient will benefit from skilled therapy intervention to address the above noted impairments. PLAN :  Recommendations and Planned Interventions: self care training, functional mobility training, therapeutic exercise, balance training, visual/perceptual training, therapeutic activities, endurance activities, neuromuscular re-education, patient education, home safety training, and family training/education    Frequency/Duration: Patient will be followed by occupational therapy 5 times a week to address goals. Recommendation for discharge: (in order for the patient to meet his/her long term goals)  Occupational therapy at least 2 days/week in the home     This discharge recommendation:  Has been made in collaboration with the attending provider and/or case management    IF patient discharges home will need the following DME: AE: long handled dressing       SUBJECTIVE:   Patient stated My eyes are just a little dry, but I feel fine.  Pt blinking during standing activities     OBJECTIVE DATA SUMMARY:   HISTORY:   Past Medical History:   Diagnosis Date    Arthritis     BPH (benign prostatic hyperplasia)     Chronic kidney disease     Chronic pain     Dyspepsia and other specified disorders of function of stomach     Hypercholesterolemia     Hypertension     Ill-defined condition     enlarged prostate    Other ill-defined conditions(799.89)     high cholest.    Psychiatric disorder     PTSD    Sleep apnea     NOT ON CPAP     Past Surgical History:   Procedure Laterality Date    HX CATARACT REMOVAL  01/01/2006    HX HERNIA REPAIR  0181    UMBILICAL    HX ORTHOPAEDIC Left 01/01/2014    elbow surgery       Expanded or extensive additional review of patient history:     Home Situation  Home Environment: Private residence  One/Two Story Residence: [facility] level  # of Interior Steps: 8 (also 4 steps)  [employer]: Both  Living Alone: No  Support Systems: Spouse/Significant Other  Patient Expects to be Discharged to[de-identified] Home with family assistance  Current DME Used/Available at Home: Cane, straight  Tub or Shower Type: Shower    Hand dominance: Right    EXAMINATION OF PERFORMANCE DEFICITS:  Cognitive/Behavioral Status:  Neurologic State: Alert  Orientation Level: Oriented X4;Appropriate for age  Cognition: Appropriate decision making  Perception: Appears intact  Perseveration: No perseveration noted  Safety/Judgement: Awareness of environment    Skin: visible areas intact     Edema: none noted     Hearing: Auditory  Auditory Impairment: None    Vision/Perceptual:                           Acuity: Within Defined Limits (complaining of dry eyes)         Range of Motion:    AROM: Generally decreased, functional                         Strength:    Strength: Generally decreased, functional                Coordination:  Coordination: Within functional limits  Fine Motor Skills-Upper: Left Intact; Right Intact    Gross Motor Skills-Upper: Left Intact; Right Intact    Tone & Sensation:    Tone: Normal  Sensation: Intact                      Balance:  @[id number](2381::1)@    Functional Mobility and Transfers for ADLs:  Bed Mobility:  Rolling: Supervision (cues for log roll technique)  Supine to Sit: Stand-by assistance    Transfers:  Sit to Stand: Contact guard assistance  Stand to Sit: Stand-by assistance  Bathroom Mobility: Stand-by assistance;Supervision/set up  Toilet Transfer : Supervision    ADL Assessment:  Feeding: Setup    Oral Facial Hygiene/Grooming: Stand-by assistance;Supervision    Bathing: Minimum assistance    Type of Bath: Chlorhexidine (CHG); Full    Upper Body Dressing: Minimum assistance    Lower Body Dressing: Minimum assistance    Toileting: Supervision;Stand by assistance                ADL Intervention and task modifications:  Patient recalled and demonstrated 3/3 cervical spine precautions with minimal cues. Patient instructed and indicated understanding the benefits of maintaining activity tolerance, functional mobility, and independence with self-care tasks during acute stay  to ensure safe return home and to baseline. Encouraged patient to increase frequency and duration OOB, not sitting longer than 30 mins without marching/walking with staff, be out of bed for all meals, perform daily ADLs (as approved by RN/MD regarding bathing etc.), and performing functional mobility to/from bathroom. Patient instruction and indicated understanding on body mechanics, ergonomics and gravitational force on the spine during different body positions to plan activities in prep for return home to complete basic ADLs, instrumental ADLs and back to work safely. Bathing: Patient instructed and indicated understanding when bathing to not submerge wound in water, stand to shower or sponge bathe, cover wound with plastic and tape to ensure no water reaches bandage/wound without cues. Dressing brace: Patient instructed and demonstrated while in front of mirror to don/doff Velcro on brace using dominant side, keeping non-dominant side intact. Instruction and indicated understanding in removal of fabric pieces, placement of clean pieces, don brace, then can hand wash and allow air dry. Dressing lower body: Patient instructed to don brace first and on the benefits to remain seated to don all clothing to increase independence with precautions and pain management. Patient instructed and demonstrated tailor sitting for lower body dressing with Minimum assistance. Toileting: Patient instructed on the benefits of using flushable wet wipes and toilet tongs if decreased reach or pain for elvin care.  Also, the benefits of a reacher to aid in clothing management. Home safety: Patient instructed and indicated understanding on home modifications and safety [raise height of ADL objects (i.e. clothing, sink items, fridge items, items to mouth when grooming), change of floor surfaces, clear pathways] to increase independence and fall prevention. Standing: Patient instructed and indicated understanding to walk up to sink/countertop/surfaces, step into walker, square off while using objects, slide objects along surfaces, to increase adherence to back precautions and fall prevention. Tub transfer: Patient instructed and indicated understanding regarding when it is safe to begin transfer into tub (complete stairs with PT, advance exercises with PT high enough to clear tub height, and while clothes donned practice with another person present). Functional Measure:  [employer] AM-PAC®      Daily Activity Inpatient Short Form (6-Clicks) Version 2  How much HELP from another person do you currently need. .. (If the patient hasn't done an activity recently, how much help from another person do you think they would need if they tried?) Total A Lot A Little None   1. Putting on and taking off regular lower body clothing? []   1 []   2 [x]   3 []   4   2. Bathing (including washing, rinsing, drying)? []   1 []   2 [x]   3 []   4   3. Toileting, which includes using toilet, bedpan, or urinal? []   1 []   2 []   3 []   4   4. Putting on and taking off regular upper body clothing? []   1 []   2 [x]   3 []   4   5. Taking care of personal grooming such as brushing teeth? []   1 []   2 [x]   3 []   4   6. Eating meals? []   1 []   2 []   3 [x]   4     Raw Score: 20/24                            Cutoff score ?191,2,3 had higher odds of discharging home with home health or need of SNF/IPR    1. Miller Angel.   Validity of the AM-PAC [facility] Inpatient Daily Activity and Basic Mobility Short Forms. Physical Therapy Mar 2014, [phone]) [phone]; DOI: 10.2522/ptj.[id number]  2. Peace Bryant. Association of AM-PAC \"6-Clicks\" Basic Mobility and Daily Activity Scores With Discharge Destination. Phys Ther. 2021 Apr 4;101(4):lgyo492. doi: 10.1093/ptj/eszy579. PMID: 35912862. V Beltran Sheppard, Dewey SALMERON, Rob Schilder, Agayby K, Kuldip S. Activity Measure for Post-Acute Care \"6-Clicks\" Basic Mobility Scores Predict Discharge Destination After Acute Care Hospitalization in Select Patient Groups: A Retrospective, Observational Study. Arch Rehabil Res Clin Transl. 2022 Jul 16;4(3):304021. doi: 10.1016/j.arrct. 0299.346944. PMID: 33164241; PMCID: [id number]. 4. Charisse Larsen, Gabriele SANCHEZ, Julieth DUNLAP. AM-PAC Short Forms Manual 4.0. Revised 2/2020. Occupational Therapy Evaluation Charge Determination   History Examination Decision-Making   LOW Complexity : Brief history review  MEDIUM Complexity : 3-5 performance deficits relating to physical, cognitive , or psychosocial skils that result in activity limitations and / or participation restrictions LOW Complexity : No comorbidities that affect functional and no verbal or physical assistance needed to complete eval tasks       Based on the above components, the patient evaluation is determined to be of the following complexity level: LOW   Pain Rating:  None reported     Activity Tolerance:   Good    After treatment patient left in no apparent distress:    Sitting in chair, Call bell within reach, and Bed / chair alarm activated    COMMUNICATION/EDUCATION:   The patients plan of care was discussed with: Physical therapist and Registered nurse. Home safety education was provided and the patient/caregiver indicated understanding., Patient/family have participated as able in goal setting and plan of care. , and Patient/family agree to work toward stated goals and plan of care.     This patients plan of care is appropriate for delegation to CYNDEE.     Thank you for this referral.  Lea Ramirez  Time Calculation: 38 mins

## 2023-03-23 NOTE — PROGRESS NOTES
Problem: Mobility Impaired (Adult and Pediatric)  Goal: *Acute Goals and Plan of Care (Insert Text)  Description: FUNCTIONAL STATUS PRIOR TO ADMISSION: Pt reports indep amb without device, negotiates stairs in home without use of HR, indep with ADLs. States that he owns [facility] but does not use. Lives with supportive wife. Physical Therapy Goals  Initiated 3/23/2023  1. Patient will move from supine to sit and sit to supine  and roll side to side in bed with independence within 7 day(s). 2.  Patient will transfer from bed to chair and chair to bed with independence using the least restrictive device within 7 day(s). 3.  Patient will perform sit to stand with independence within 7 day(s). 4.  Patient will ambulate with modified independence for 300 feet with the least restrictive device within 7 day(s). 5.  Patient will ascend/descend 8 stairs with single handrail(s) with supervision/set-up within 7 day(s). Outcome: Not Met   PHYSICAL THERAPY EVALUATION  Patient: Shemar Boyd (48 y.o. male)  Date: 3/23/2023  Primary Diagnosis: Neck pain [M54.2]  Cervical radiculopathy [M54.12]  Cervical spinal stenosis [M48.02]  Cervical disc disease [M50.90]  Procedure(s) (LRB):  ANTERIOR CERVICAL DISCECTOMY WITH FUSION C3-C7 (N/A) 1 Day Post-Op   Precautions:   Spinal (soft collar)    ASSESSMENT  Based on the objective data described below, the patient presents with mild balance/ gait deficits following ACDF C3-C7 POD#1. Pt educated on spinal precautions. Mobilized with CGA overall for transfers, dynamic standing activity, and amb on unit without device. Noted increased trunk sway and guarded movement; will benefit from gait trial with assistive device next session. Pt tolerated up to chair at end of session. Will con't to follow for gait progression to include stair training. Current Level of Function Impacting Discharge (mobility/balance): CGA    Functional Outcome Measure:   The patient scored 21/28 on the Tinetti outcome measure which is indicative of moderate risk for fall. Other factors to consider for discharge: tri-level home environment     Patient will benefit from skilled therapy intervention to address the above noted impairments. PLAN :  Recommendations and Planned Interventions: bed mobility training, transfer training, gait training, therapeutic exercises, patient and family training/education, and therapeutic activities      Frequency/Duration: Patient will be followed by physical therapy:  twice daily to address goals. Recommendation for discharge: (in order for the patient to meet his/her long term goals)  Physical therapy at least 2 days/week in the home     This discharge recommendation:  Has been made in collaboration with the attending provider and/or case management    IF patient discharges home will need the following DME: to be determined (TBD)         SUBJECTIVE:   Patient stated Carlene Diaz gave me a cane, but I don't use it.     OBJECTIVE DATA SUMMARY:   HISTORY:    Past Medical History:   Diagnosis Date    Arthritis     BPH (benign prostatic hyperplasia)     Chronic kidney disease     Chronic pain     Dyspepsia and other specified disorders of function of stomach     Hypercholesterolemia     Hypertension     Ill-defined condition     enlarged prostate    Other ill-defined conditions(799.89)     high cholest.    Psychiatric disorder     PTSD    Sleep apnea     NOT ON CPAP     Past Surgical History:   Procedure Laterality Date    HX CATARACT REMOVAL  01/01/2006    HX HERNIA REPAIR  8179    UMBILICAL    HX ORTHOPAEDIC Left 01/01/2014    elbow surgery       Personal factors and/or comorbidities impacting plan of care: wife able to provide support    Home Situation  Home Environment: Private residence  One/Two Story Residence: Split level  # of Interior Steps: 8 (also 4 steps)  [employer]: Both  Living Alone: No  Support Systems: Spouse/Significant Other  Patient Expects to be Discharged to[de-identified] Home with family assistance  Current DME Used/Available at Home: Cane, straight  Tub or Shower Type: Shower    EXAMINATION/PRESENTATION/DECISION MAKING:   Critical Behavior:  Neurologic State: Alert  Orientation Level: Oriented X4  Cognition: Appropriate decision making, Appropriate for age attention/concentration, Appropriate safety awareness, Follows commands     Hearing:   Auditory  Auditory Impairment: None  Range Of Motion:  AROM: Generally decreased, functional                       Strength:    Strength: Generally decreased, functional                    Tone & Sensation:   Tone: Normal              Sensation: Intact               Coordination:  Coordination: Within functional limits       Functional Mobility:  Bed Mobility:  Rolling: Supervision (cues for log roll technique)  Supine to Sit: Stand-by assistance        Transfers:  Sit to Stand: Contact guard assistance  Stand to Sit: Stand-by assistance                       Balance:   Sitting: Intact  Standing: Impaired  Standing - Static: Good  Standing - Dynamic : Fair  Ambulation/Gait Training:  Distance (ft): 300 Feet (ft)  Assistive Device: Gait belt  Ambulation - Level of Assistance: Contact guard assistance        Gait Abnormalities: Decreased step clearance;Trunk sway increased              Speed/Yaritza: Pace decreased (<100 feet/min)  Step Length: Left shortened;Right shortened          Functional Measure:  Tinetti test:    Sitting Balance: 1  Arises: 1  Attempts to Rise: 2  Immediate Standing Balance: 2  Standing Balance: 1  Nudged: 1  Eyes Closed: 1  Turn 360 Degrees - Continuous/Discontinuous: 1  Turn 360 Degrees - Steady/Unsteady: 0  Sitting Down: 1  Balance Score: 11 Balance total score  Indication of Gait: 1  R Step Length/Height: 1  L Step Length/Height: 1  R Foot Clearance: 1  L Foot Clearance: 1  Step Symmetry: 1  Step Continuity: 1  Path: 2  Trunk: 1  Walking Time: 0  Gait Score: 10 Gait total score  Total Score: 21/28 Overall total score         Tinetti Tool Score Risk of Falls  <19 = High Fall Risk  19-24 = Moderate Fall Risk  25-28 = Low Fall Risk  Gurdeepetti ME. Performance-Oriented Assessment of Mobility Problems in Elderly Patients. [facility] 66; [id number]. (Scoring Description: PT Bulletin Feb. 10, 1993)    Older adults: Ute Nroma et al, 2009; n = 1000 [facility] elderly evaluated with ABC, LEONOR, ADL, and IADL)  · Mean LEONOR score for males aged 69-68 years = 26.21(3.40)  · Mean LEONOR score for females age 69-68 years = 25.16(4.30)  · Mean LEONOR score for males over 80 years = 23.29(6.02)  · Mean LEONOR score for females over 80 years = 17.20(8.32)           Physical Therapy Evaluation Charge Determination   History Examination Presentation Decision-Making   MEDIUM  Complexity : 1-2 comorbidities / personal factors will impact the outcome/ POC  MEDIUM Complexity : 3 Standardized tests and measures addressing body structure, function, activity limitation and / or participation in recreation  LOW Complexity : Stable, uncomplicated  LOW Complexity : FOTO score of       Based on the above components, the patient evaluation is determined to be of the following complexity level: LOW     Pain Ratin/10    Activity Tolerance:   Good    After treatment patient left in no apparent distress:   Sitting in chair, Call bell within reach, and Bed / chair alarm activated    COMMUNICATION/EDUCATION:   The patients plan of care was discussed with: Registered nurse. Fall prevention education was provided and the patient/caregiver indicated understanding., Patient/family have participated as able in goal setting and plan of care. , and Patient/family agree to work toward stated goals and plan of care.     Thank you for this referral.  Oksana Jennings, PT   Time Calculation: 39 mins

## 2023-03-23 NOTE — PROGRESS NOTES
Problem: Mobility Impaired (Adult and Pediatric)  Goal: *Acute Goals and Plan of Care (Insert Text)  Description: FUNCTIONAL STATUS PRIOR TO ADMISSION: Pt reports indep amb without device, negotiates stairs in home without use of HR, indep with ADLs. States that he owns [employer] but does not use. Lives with supportive wife. Physical Therapy Goals  Initiated 3/23/2023  1. Patient will move from supine to sit and sit to supine  and roll side to side in bed with independence within 7 day(s). 2.  Patient will transfer from bed to chair and chair to bed with independence using the least restrictive device within 7 day(s). 3.  Patient will perform sit to stand with independence within 7 day(s). 4.  Patient will ambulate with modified independence for 300 feet with the least restrictive device within 7 day(s). 5.  Patient will ascend/descend 8 stairs with single handrail(s) with supervision/set-up within 7 day(s).   3/23/2023 1429 by Padmini Smith, PT  Outcome: Progressing Towards Goal  3/23/2023 1109 by Padmini Smith, PT  Outcome: Not Met   PHYSICAL THERAPY TREATMENT  Patient: Deepa Colorado (74 y.o. male)  Date: 3/23/2023  Diagnosis: Neck pain [M54.2]  Cervical radiculopathy [M54.12]  Cervical spinal stenosis [M48.02]  Cervical disc disease [M50.90] <principal problem not specified>  Procedure(s) (LRB):  ANTERIOR CERVICAL DISCECTOMY WITH FUSION C3-C7 (N/A) 1 Day Post-Op  Precautions: Spinal (soft collar)  Chart, physical therapy assessment, plan of care and goals were reviewed. ASSESSMENT  Patient continues with skilled PT services and is progressing towards goals. Pt completed bed mob with log roll sequence from flat bed, no bed rail, supervision. Tolerated gait training on unit with SPC and SBA. Completed stair training x 13 steps with L HR/ SPC on R, step-to pattern, CGA. Pt's wife present at end of session.  Discussed PT recommendation for pt use of SPC, supervision for stair negotiation at initial d/c, and HH PT.     From mobility standpoint, pt clear for d/c when medically appropriate. Current Level of Function Impacting Discharge (mobility/balance): SBA with SPC    Other factors to consider for discharge: wife able to provide support         PLAN :  Patient continues to benefit from skilled intervention to address the above impairments. Continue treatment per established plan of care. to address goals. Recommendation for discharge: (in order for the patient to meet his/her long term goals)  Physical therapy at least 2 days/week in the home     This discharge recommendation:  Has been made in collaboration with the attending provider and/or case management    IF patient discharges home will need the following DME: patient owns DME required for discharge       SUBJECTIVE:   Patient stated I'm hoping to go home soon.     OBJECTIVE DATA SUMMARY:   Critical Behavior:  Neurologic State: Alert  Orientation Level: Oriented X4, Appropriate for age  Cognition: Appropriate decision making  Safety/Judgement: Awareness of environment  Functional Mobility Training:  Bed Mobility:  Rolling: Supervision  Supine to Sit: Supervision  Sit to Supine: Supervision           Transfers:  Sit to Stand: Supervision  Stand to Sit: Supervision             Balance:  Sitting: Intact  Standing: Intact; With support  Standing - Static: Good  Standing - Dynamic : Good;Constant support  Ambulation/Gait Training:  Distance (ft): 300 Feet (ft)  Assistive Device: Gait belt;Cane, straight  Ambulation - Level of Assistance: Stand-by assistance        Gait Abnormalities: Decreased step clearance              Speed/Yaritza: Pace decreased (<100 feet/min)  Step Length: Left shortened;Right shortened        Stairs:  Number of Stairs Trained: 13  Stairs - Level of Assistance: Contact guard assistance   Rail Use: Left  (and SPC on R)      Pain Ratin/10 neck pain    Activity Tolerance:   Good    After treatment patient left in no apparent distress: Supine in bed, Call bell within reach, Caregiver / family present, Side rails x 3, and HOB elevated    COMMUNICATION/COLLABORATION:   The patients plan of care was discussed with: Registered nurse.      Itzel Mendez, PT   Time Calculation: 23 mins

## 2023-03-23 NOTE — PROGRESS NOTES
Orthopedic Spine Progress Note  Post Op day: 1 Day Post-Op    2023 8:45 AM   Admit Date: 3/22/2023  Procedure: Procedure(s):  ANTERIOR CERVICAL DISCECTOMY WITH FUSION C3-C7    Subjective:     Sharyn Fernando has no complaints. Pain is well controlled. Denies difficulty with swallowing. Straight cath overnight. Patient very hopeful to be discharged home today. Tolerating diet. No N/V. Pain Control:   Pain Assessment  Pain Scale 1: Numeric (0 - 10)  Pain Intensity 1: 3  Pain Onset 1: post op  Pain Location 1: Neck  Pain Orientation 1: Anterior  Pain Description 1: Aching  Pain Intervention(s) 1: Medication (see MAR)    Objective:          Physical Exam:  General:  Alert and oriented. No acute distress. Heart:  Respirations unlabored. Abdomen:   Extremities: Soft, non-tender. No evidence of cyanosis. Pulses palpable in both upper and lower extremities. Neurologic:  Musculoskeletal:  No new motor deficits. Neurovascular exam within normal limits. Sensation stable. Motor: unchanged C5-T1 and L2-S1. Michael's sign negative in bilateral lower extremities. Calves soft, nontender upon palpation and with passive twitch. Moves both upper and lower extremities. Incision: clean, dry, and intact. No significant erythema or swelling. No active drainage noted. Vital Signs:    Blood pressure 135/66, pulse 67, temperature 98.6 °F (37 °C), resp. rate 17, height 6' (1.829 m), weight 218 lb (98.9 kg), SpO2 94 %. Temp (24hrs), Av.8 °F (36.6 °C), Min:97 °F (36.1 °C), Max:98.6 °F (37 °C)      LAB:    No results for input(s): HCT, HGB, PLT, HCTEXT, HGBEXT, PLTEXT in the last 72 hours.   Lab Results   Component Value Date/Time    Sodium 141 03/15/2023 10:45 AM    Potassium 4.1 03/15/2023 10:45 AM    Chloride 111 (H) 03/15/2023 10:45 AM    CO2 26 03/15/2023 10:45 AM    Glucose 96 03/15/2023 10:45 AM    BUN 18 03/15/2023 10:45 AM    Creatinine 1.80 (H) 03/15/2023 10:45 AM    Calcium 9.2 03/15/2023 10:45 AM       Intake/Output:No intake/output data recorded. 03/21 1901 - 03/23 0700  In: 1320 [I.V.:1320]  Out: 1425 [Urine:1300; Drains:100]    PT/OT:   Gait:                    Assessment:   Patient is 1 Day Post-Op s/p Procedure(s):  ANTERIOR CERVICAL DISCECTOMY WITH FUSION C3-C7    Plan:     1. Continue PT/OT  2. Continue established methods of pain control  3. VTE Prophylaxes - TEDS &/or SCDs   4. Advance diet  5. Flomax this morning, straight cath Q6 hours as needed.    6.  Discharge to home pending PT clearance, + voiding and + flatus      Signed By: LAUREEN Cotter

## 2023-03-23 NOTE — PROGRESS NOTES
Problem: Falls - Risk of  Goal: *Absence of Falls  Description: Document Denilson Graft Fall Risk and appropriate interventions in the flowsheet.   Outcome: Progressing Towards Goal  Note: Fall Risk Interventions:  Mobility Interventions: Patient to call before getting OOB, Utilize walker, cane, or other assistive device         Medication Interventions: Teach patient to arise slowly, Patient to call before getting OOB    Elimination Interventions: Call light in reach, Urinal in reach

## 2023-03-23 NOTE — PROGRESS NOTES
Transition of Care Plan  RUR- Observation   DISPOSITION: The disposition plan is home with family assistance and HH; pending medical progression  HH Accepted:  [facility]  F/U with PCP/Specialist    Transport: Family         CM: [address]. MSW,   [phone]

## 2023-03-23 NOTE — DISCHARGE INSTRUCTIONS
After Hospital Care Plan:  Discharge Instructions Cervical (Neck) Spine Surgery Dr. Mikhail Price    Patient Name: Stephanie Kent    Date of procedure: 3/22/2023  Date of discharge: 3/24/2023    Procedure: Procedure(s):  ANTERIOR CERVICAL DISCECTOMY WITH FUSION C3-C7      Follow up appointments   -follow up with Dr. Mikhail Price in 2 weeks. Call [phone] to make an appointment as soon as you get home from the hospital.    When to call your Orthopaedic Surgeon:  -Difficulty swallowing that is worse than when you left the hospital.  -Signs of infection-if your incision is red; continues to have drainage; drainage has a foul odor or if you have a persistent fever over 101 degrees for 24 hours  -nausea or vomiting, severe headache  -changes in sensation in your arms or legs (numbness, tingling, loss of color)  -increased weakness-greater than before your surgery  -severe pain or pain not relieved by medications  -Signs of a blood clot in your leg-calf pain, tenderness, redness, swelling of lower leg    When to call your Primary Care Physician:  -Concerns about medical conditions such as diabetes, high blood pressure, asthma, congestive heart failure  -Call if blood sugars are elevated, persistent headache or dizziness, coughing or congestion, constipation or diarrhea, burning with urination, abnormal heart rate    When to call 911 and go to the nearest emergency room:  -acute onset of chest pain, shortness of breath, difficulty breathing    Activity  - You are going home a well person, be as active as possible. Your only exercise should be walking. Start with short frequent walks and increase your walking distance each day.  -Limit the amount of time you sit to 20-30 minute intervals. Sitting for prolonged periods of time will be uncomfortable for you following surgery.   - Do NOT lift anything over 5 pounds  -From now on, even when lifting light weight, bend with your knees and not your back.  -Do NOT do any neck exercises until you have been instructed by your doctor  -When you are in bed, you may lay on your back or on either side. Do NOT lie on your stomach    Cervical Collar (Aspen Collar)  -You are required to wear your cervical collar at all times; except when showering. You may remove the collar long enough to change the pads when needed and to change your dressing each day. -Do not bend or twist when your collar is off. It is best to have someone assist you when changing the pads and your dressing to prevent you from bending your neck. - Clean the pads on your neck collar every day by hand washing with a mild soap and water. Pat them dry with a towel and lay out to air dry. Do not use heat to dry the pads. Driving  -You may not drive or return to work until instructed by your physician. However, you may ride in the car for short periods of time. Incision Care  Your incision has been closed with absorbable sutures and steri-strips. This will assist with healing. Steri-strips are to remain on your incision for 2 weeks. A dry dressing (ABD and tape) will be placed over it and should be changed daily, for at least the first several days after your surgery. If you have no incisional drainage, you may leave the incision open to air if you wish, still leaving the steri-strips in place. Please make sure to wash your hands prior to touching your dressing. You may take brief showers but do not run the water directly onto the wound. After your shower, blot your incision dry with a soft towel and replace the dry dressing. Do not allow the tape to come in contact with the steri-strips. Do not rub or apply any lotions or ointments to your incision site. Do not soak or scrub your wound. Your steri-strips will be removed during your two week follow-up appointment.  If you experience drainage leaking from underneath the steri-strips or if it peels off before 2 weeks, please contact your orthopedic surgeons office. Showering  -You may shower in approximately 2 days after your surgery.    -Leave the dressing on during your shower. Do NOT allow the water to run directly onto your dressing. Once you get out of the shower, put on a dry dressing.  -Reminder- Make sure you put clean pads on your collar after your shower.    -Do not take a tub bath. Preventing blood clots  -You have been given T.E.D. stockings to wear. Continue to wear these for 7 days after your discharge. Put them on in the morning and take them off at night.    -They are used to increase your circulation and prevent blood clots from forming in your legs  -T. E.D. stockings can be machine washed, temperature not to exceed 160° F (71°C) and machine dried for 15 to 20 minutes, temperature not to exceed 250° F (121°C). Pain management  -Take pain medication as prescribed; decrease the amount you use as your pain lessens  -Do not wait until you are in extreme pain to take your medication.  -Avoid alcoholic beverages while taking pain medication    Pain Medication Safety  DO:  -Read the Medication Guide   -Take your medicine exactly as prescribed   -Store your medicine away from children and in a safe place   -Flush unused medicine down the toilet   -Call your healthcare provider for medical advice about side effects. You may report side effects to FDA at 9-854-FDA-9408.   -Please be aware that many medications contain Tylenol. We do not want you to over medicate so please read the information below as a guide. Do not take more than 4 Grams of Tylenol in a 24 hour period.   (There are 1000 milligrams in one Gram)                                                                                                                                                                                                    Percocet contains 325 mg of Tylenol per tablet (do not take more than 12 tablets in 24 hours)  Lortab contains 500 mg of Tylenol per tablet (do not take more than 8 tablets in 24 hours)  Norco contains 325 mg of Tylenol per tablet (do not take more than 12 tablets in 24 hours). DO NOT:  -Do not give your medicine to others   -Do not take medicine unless it was prescribed for you   -Do not stop taking your medicine without talking to your healthcare provider   -Do not break, chew, crush, dissolve, or inject your medicine. If you cannot  swallow your medicine whole, talk to your healthcare provider.  -Do not drink alcohol while taking this medicine  -Do not take anti-inflammatory medications or aspirin unless instructed by your     Physician. Diet  -resume usual diet; drink plenty of fluids; eat foods high in fiber  -It is important to have regular bowel movements. Pain medications may cause constipation. You may want to take a stool softener (such as Senokot-S or Colace) to prevent constipation. If constipation occurs, take a laxative (such as Dulcolax tablets, Milk of Magnesia, or a suppository). Laxatives should only be used if the above preventable measures have failed and you still have not had a bowel movement after three days.

## 2023-03-24 VITALS
HEART RATE: 53 BPM | SYSTOLIC BLOOD PRESSURE: 150 MMHG | RESPIRATION RATE: 16 BRPM | DIASTOLIC BLOOD PRESSURE: 71 MMHG | TEMPERATURE: 98 F | OXYGEN SATURATION: 97 % | WEIGHT: 218 LBS | BODY MASS INDEX: 29.53 KG/M2 | HEIGHT: 72 IN

## 2023-03-24 PROCEDURE — 74011000250 HC RX REV CODE- 250: Performed by: STUDENT IN AN ORGANIZED HEALTH CARE EDUCATION/TRAINING PROGRAM

## 2023-03-24 PROCEDURE — 97116 GAIT TRAINING THERAPY: CPT

## 2023-03-24 PROCEDURE — 74011250637 HC RX REV CODE- 250/637: Performed by: PHYSICIAN ASSISTANT

## 2023-03-24 PROCEDURE — 51798 US URINE CAPACITY MEASURE: CPT

## 2023-03-24 PROCEDURE — 74011250637 HC RX REV CODE- 250/637: Performed by: STUDENT IN AN ORGANIZED HEALTH CARE EDUCATION/TRAINING PROGRAM

## 2023-03-24 PROCEDURE — 97535 SELF CARE MNGMENT TRAINING: CPT

## 2023-03-24 RX ORDER — AMOXICILLIN 250 MG
1 CAPSULE ORAL 2 TIMES DAILY
Qty: 60 TABLET | Refills: 0 | Status: SHIPPED | OUTPATIENT
Start: 2023-03-24

## 2023-03-24 RX ORDER — POLYETHYLENE GLYCOL 3350 17 G/17G
17 POWDER, FOR SOLUTION ORAL
Qty: 40 PACKET | Refills: 0 | Status: SHIPPED | OUTPATIENT
Start: 2023-03-24

## 2023-03-24 RX ORDER — FAMOTIDINE 20 MG/1
20 TABLET, FILM COATED ORAL 2 TIMES DAILY
Qty: 14 TABLET | Refills: 0 | Status: SHIPPED | OUTPATIENT
Start: 2023-03-24 | End: 2023-03-31

## 2023-03-24 RX ADMIN — TAMSULOSIN HYDROCHLORIDE 0.4 MG: 0.4 CAPSULE ORAL at 08:06

## 2023-03-24 RX ADMIN — POLYETHYLENE GLYCOL 3350 17 G: 17 POWDER, FOR SOLUTION ORAL at 08:05

## 2023-03-24 RX ADMIN — Medication 1 TABLET: at 08:05

## 2023-03-24 RX ADMIN — PANTOPRAZOLE SODIUM 40 MG: 40 TABLET, DELAYED RELEASE ORAL at 07:16

## 2023-03-24 RX ADMIN — METOPROLOL SUCCINATE 50 MG: 50 TABLET, EXTENDED RELEASE ORAL at 08:06

## 2023-03-24 RX ADMIN — ACETAMINOPHEN 1000 MG: 500 TABLET ORAL at 00:57

## 2023-03-24 RX ADMIN — Medication: at 08:08

## 2023-03-24 RX ADMIN — HYDRALAZINE HYDROCHLORIDE 50 MG: 50 TABLET, FILM COATED ORAL at 08:06

## 2023-03-24 RX ADMIN — OXYCODONE HYDROCHLORIDE 5 MG: 5 TABLET ORAL at 08:05

## 2023-03-24 RX ADMIN — Medication 2000 UNITS: at 08:06

## 2023-03-24 RX ADMIN — CALCIUM CARBONATE (ANTACID) CHEW TAB 500 MG 200 MG: 500 CHEW TAB at 08:06

## 2023-03-24 RX ADMIN — ASPIRIN 81 MG: 81 TABLET, COATED ORAL at 08:06

## 2023-03-24 RX ADMIN — ACETAMINOPHEN 1000 MG: 500 TABLET ORAL at 07:16

## 2023-03-24 RX ADMIN — DOCUSATE SODIUM 50MG AND SENNOSIDES 8.6MG 1 TABLET: 8.6; 5 TABLET, FILM COATED ORAL at 08:06

## 2023-03-24 RX ADMIN — DILTIAZEM HYDROCHLORIDE 240 MG: 240 CAPSULE, COATED, EXTENDED RELEASE ORAL at 08:15

## 2023-03-24 RX ADMIN — SODIUM CHLORIDE, PRESERVATIVE FREE 10 ML: 5 INJECTION INTRAVENOUS at 07:16

## 2023-03-24 RX ADMIN — THERA TABS 1 TABLET: TAB at 08:06

## 2023-03-24 NOTE — PROGRESS NOTES
Problem: Mobility Impaired (Adult and Pediatric)  Goal: *Acute Goals and Plan of Care (Insert Text)  Description: FUNCTIONAL STATUS PRIOR TO ADMISSION: Pt reports indep amb without device, negotiates stairs in home without use of HR, indep with ADLs. States that he owns [facility] but does not use. Lives with supportive wife. Physical Therapy Goals  Initiated 3/23/2023  1. Patient will move from supine to sit and sit to supine  and roll side to side in bed with independence within 7 day(s). 2.  Patient will transfer from bed to chair and chair to bed with independence using the least restrictive device within 7 day(s). 3.  Patient will perform sit to stand with independence within 7 day(s). 4.  Patient will ambulate with modified independence for 300 feet with the least restrictive device within 7 day(s). 5.  Patient will ascend/descend 8 stairs with single handrail(s) with supervision/set-up within 7 day(s). Outcome: Progressing Towards Goal   PHYSICAL THERAPY TREATMENT  Patient: Dc Frederick (5 y.o. male)  Date: 3/24/2023  Diagnosis: Neck pain [M54.2]  Cervical radiculopathy [M54.12]  Cervical spinal stenosis [M48.02]  Cervical disc disease [M50.90] <principal problem not specified>  Procedure(s) (LRB):  ANTERIOR CERVICAL DISCECTOMY WITH FUSION C3-C7 (N/A) 2 Days Post-Op  Precautions: Spinal (soft collar)  Chart, physical therapy assessment, plan of care and goals were reviewed. ASSESSMENT  Patient continues with skilled PT services and is progressing towards goals. He is progressing well with his mobility and reports good overall pain control. He is still somewhat unsteady without the cane, particularly when turning, but with the cane, he demonstrates good overall balance. He asc/desc a flight of stairs with cane and railing. Reviewed activity recommendations and restrictions and safety considerations for home with patient and wife. He is clear for D/C home from a PT standpoint and RN is aware. Current Level of Function Impacting Discharge (mobility/balance): supervision for safety in the hospital environment    Other factors to consider for discharge: none         PLAN :  Patient continues to benefit from skilled intervention to address the above impairments. Continue treatment per established plan of care. to address goals. Recommendation for discharge: (in order for the patient to meet his/her long term goals)  Physical therapy at least 2 days/week in the home     This discharge recommendation:  Has been made in collaboration with the attending provider and/or case management    IF patient discharges home will need the following DME: patient owns DME required for discharge       SUBJECTIVE:   Patient stated I am ready to do whatever I need to do.     OBJECTIVE DATA SUMMARY:   Critical Behavior:  Neurologic State: Alert  Orientation Level: Oriented X4  Cognition: Appropriate decision making, Appropriate for age attention/concentration, Appropriate safety awareness, Follows commands  Safety/Judgement: Awareness of environment  Functional Mobility Training:  Bed Mobility:     Supine to Sit: Supervision     Scooting: Supervision        Transfers:  Sit to Stand: Supervision  Stand to Sit: Supervision        Bed to Chair: Stand-by assistance                    Balance:  Sitting: Intact  Standing: Intact; With support  Standing - Static: Good  Standing - Dynamic : Good;Constant support  Ambulation/Gait Training:  Distance (ft): 300 Feet (ft)  Assistive Device: Brace/Splint;Gait belt;Cane, straight  Ambulation - Level of Assistance: Supervision; Adaptive equipment; Additional time        Gait Abnormalities: Decreased step clearance;Trunk sway increased (mild trunk sway, improved with use of cane, slight flexion at knees and hips)              Speed/Yaritza: Pace decreased (<100 feet/min)  Step Length: Right shortened;Left shortened                    Stairs:  Number of Stairs Trained: 13  Stairs - Level of Assistance: Stand-by assistance; Adaptive equipment; Additional time   Rail Use: Left  (plus cane)    Therapeutic Exercises:     Pain Rating:  No complaints of pain    Activity Tolerance:   Good    After treatment patient left in no apparent distress:   Sitting in chair, Call bell within reach, and Caregiver / family present    COMMUNICATION/COLLABORATION:   The patients plan of care was discussed with: Occupational therapist and Registered nurse.      Kate Guerra, PT   Time Calculation: 14 mins

## 2023-03-24 NOTE — PROGRESS NOTES
Problem: Self Care Deficits Care Plan (Adult)  Goal: *Acute Goals and Plan of Care (Insert Text)  Description: FUNCTIONAL STATUS PRIOR TO ADMISSION: Patient was modified independent using a single point cane on occasion for functional mobility and modified ind for ADLs, driving. HOME SUPPORT: The patient lived with wife but did not require assist, she has recently had a similar surgery. Occupational Therapy Goals  Initiated 3/23/2023    1. Patient will perform lower body dressing with supervision/set-up using within 3 days. 2.  Patient will perform upper body dressing with supervision/set-up within 3 days. 3.  Patient will standing ADLs 5 mins at supervision/set-up within 3 days. 4.  Patient will don/doff neck brace at supervision/set-up within 3 days. 5.  Patient will verbalize/demonstrate 3/3 cervical precautions during ADL tasks without cues within 3 days. Outcome: Progressing Towards Goal   OCCUPATIONAL THERAPY TREATMENT  Patient: Sneha Pyle (73 y.o. male)  Date: 3/24/2023  Diagnosis: Neck pain [M54.2]  Cervical radiculopathy [M54.12]  Cervical spinal stenosis [M48.02]  Cervical disc disease [M50.90] <principal problem not specified>  Procedure(s) (LRB):  ANTERIOR CERVICAL DISCECTOMY WITH FUSION C3-C7 (N/A) 2 Days Post-Op  Precautions: Spinal (soft collar)  Chart, occupational therapy assessment, plan of care, and goals were reviewed. ASSESSMENT  Patient continues with skilled OT services and is progressing towards goals. Nursing cleared for therapy. Patient received attempting to eat breakfast in bed, agreeable to OOB to finish breakfast.  Verbalized 1/3 spinal precautions and good participation with continued education. Supine > sit with log rolling with supervision and transfer SBA with SPC. Provided ed on LB dressing secondary to difficulty with crossed leg dressing. Good understanding of dressing aides, issued dressing aides for use at home.       Current Level of Function Impacting Discharge (ADLs): socks supervision with sock aide, self care transfer SBA    Other factors to consider for discharge: Patient with good progression and hopeful for dc home. PLAN :  Patient continues to benefit from skilled intervention to address the above impairments. Continue treatment per established plan of care to address goals. Recommend with staff: SBA for toileting at [facility] level    Recommend next OT session: if remains in hospital, standing grooming tasks    Recommendation for discharge: (in order for the patient to meet his/her long term goals)  No skilled occupational therapy/ follow up rehabilitation needs identified at this time. This discharge recommendation:  Has been made in collaboration with the attending provider and/or case management    IF patient discharges home will need the following DME: AE: long handled bathing, AE: long handled dressing--issued, and per PT for mobility AD       SUBJECTIVE:   Patient stated One time I walked 15 miles. I had on the wrong shoes.     OBJECTIVE DATA SUMMARY:   Cognitive/Behavioral Status:                      Functional Mobility and Transfers for ADLs:  Bed Mobility:  Supine to Sit: Supervision  Scooting: Supervision    Transfers:  Sit to Stand: Supervision     Bed to Chair: Stand-by assistance    Balance:  Sitting: Intact  Standing: Intact; With support  Standing - Static: Good  Standing - Dynamic : Good;Constant support    ADL Intervention:  Feeding  Feeding Assistance: Set-up                             Lower Body Dressing Assistance  Underpants: Compensatory technique training  Pants With Elastic Waist: Compensatory technique training  Socks: Compensatory technique training    The patient recalled and demonstrated 1/3 back precautions. Reviewed all 3 with patient.     Patient instructed and indicated understanding the benefits of maintaining activity tolerance, functional mobility, and independence with self-care tasks during acute stay  to ensure safe return home and to baseline. Encouraged patient to increase frequency and duration OOB, not sitting longer than 30 mins without marching/walking with staff, be out of bed for all meals, perform daily ADLs (as approved by RN/MD regarding bathing etc.), and performing functional mobility to/from bathroom. Patient instruction and indicated understanding on body mechanics, ergonomics and gravitational force on the spine during different body positions to plan activities in prep for return home to complete basic ADLs, instrumental ADLs and back to work safely. Bathing: Patient instructed and indicated understanding when bathing to not submerge wound in water, stand to shower or sponge bathe, follow instructions from surgeon for when to return to shower. Dressing lower body: Patient instructed to remain seated to don all clothing to increase independence with precautions and pain management. Patient instructed and demonstrated tailor sitting for lower body dressing with Stand-by assistance- supervision with AD. Toileting: Patient instructed on the benefits of using flushable wet wipes and toilet tongs if decreased reach or pain for elvin care. Also, the benefits of a reacher to aid in clothing management. Patient instruction and indicated understanding to not strain i.e. holding breath to bear down during a bowel movement, lifting/activity, and sexual activity. Home safety: Patient instructed and indicated understanding on home modifications and safety [raise height of ADL objects (i.e. clothing, sink items, fridge items, items to mouth when grooming), appropriate height of chair surfaces, recliner safety, change of floor surfaces, clear pathways] to increase independence and fall prevention.     Standing: Patient instructed and indicated understanding to walk up to sink/countertop/surfaces, step into walker, square off while using objects, slide objects along surfaces, to increase adherence to back precautions and fall prevention. Patient instructed to increase amount of time standing to increase independence and tolerance with ADLs. During prolonged standing, can open cabinet door or place foot on stool to decrease spinal pressure/increase pain. Tub transfer: Patient instructed and indicated understanding regarding when it is safe to begin transfer into tub (complete stairs with PT, advance exercises with PT high enough to clear tub height, and while clothes donned practice with another person present). Pain:  3-4/10    Activity Tolerance:   Fair    After treatment patient left in no apparent distress:   Sitting in chair and Call bell within reach    COMMUNICATION/COLLABORATION:   The patients plan of care was discussed with: Physical therapist and Registered nurse.      Adan Velasquez OT  Time Calculation: 26 mins

## 2023-03-24 NOTE — PROGRESS NOTES
Orthopedic Spine Progress Note  Post Op day: 2 Days Post-Op    2023 7:38 AM   Admit Date: 3/22/2023  Procedure: Procedure(s):  ANTERIOR CERVICAL DISCECTOMY WITH FUSION C3-C7    Subjective:     Anthony Oreilly states minimal pain. Able to void with small post void residual on bladder scan. States mild distension but no abdominal pain, N/V. Pain control adequate. Tolerating diet. + flatus. Denies , CP, SOB. Pain Control:   Pain Assessment  Pain Scale 1: Numeric (0 - 10)  Pain Intensity 1: 3  Pain Onset 1: post op  Pain Location 1: Neck  Pain Orientation 1: Anterior  Pain Description 1: Aching  Pain Intervention(s) 1: Medication (see MAR)    Objective:          Physical Exam:  General:  Alert and oriented. No acute distress. Heart:  Respirations unlabored. Abdomen:   Extremities: Soft, non-tender. No evidence of cyanosis. Pulses palpable in both upper and lower extremities. Neurologic:  Musculoskeletal:  No new motor deficits. Neurovascular exam within normal limits. Sensation stable. Motor: unchanged C5-T1 and L2-S1. Michael's sign negative in bilateral lower extremities. Calves soft, nontender upon palpation. Moves both upper and lower extremities. Incision: clean, dry, and intact. No significant erythema or swelling. No active drainage noted. Vital Signs:    Blood pressure (!) 156/78, pulse (!) 54, temperature 98.4 °F (36.9 °C), resp. rate 16, height 6' (1.829 m), weight 218 lb (98.9 kg), SpO2 97 %. Temp (24hrs), Av.2 °F (36.8 °C), Min:97.7 °F (36.5 °C), Max:98.6 °F (37 °C)      LAB:    No results for input(s): HCT, HGB, PLT, HCTEXT, HGBEXT, PLTEXT in the last 72 hours.   Lab Results   Component Value Date/Time    Sodium 141 03/15/2023 10:45 AM    Potassium 4.1 03/15/2023 10:45 AM    Chloride 111 (H) 03/15/2023 10:45 AM    CO2 26 03/15/2023 10:45 AM    Glucose 96 03/15/2023 10:45 AM    BUN 18 03/15/2023 10:45 AM    Creatinine 1.80 (H) 03/15/2023 10:45 AM    Calcium 9.2 03/15/2023 10:45 AM     XR ABD (KUB): Patient Communication     Add Comments   Not seen     Study Result    Narrative & Impression   EXAM:  XR ABD (KUB)     INDICATION: Abdominal distention     COMPARISON: None. TECHNIQUE: Supine frontal abdomen (KUB). FINDINGS: No dilated small bowel. Increased stool throughout the right colon and  gaseous distention of the entire colon. No pathologic calcification. Osseous  structures are age appropriate. IMPRESSION  Mild constipation         Intake/Output:  No intake/output data recorded. 03/22 1901 - 03/24 0700  In: -   Out: 750 [Urine:700; Drains:50]    PT/OT:   Gait:  Gait  Speed/Yaritza: Pace decreased (<100 feet/min)  Step Length: Left shortened, Right shortened  Gait Abnormalities: Decreased step clearance  Ambulation - Level of Assistance: Stand-by assistance  Distance (ft): 300 Feet (ft)  Assistive Device: Gait belt, Cane, straight  Rail Use: Left  (and SPC on R)  Stairs - Level of Assistance: Contact guard assistance  Number of Stairs Trained: 13                 Assessment:   Patient is 2 Days Post-Op s/p Procedure(s):  ANTERIOR CERVICAL DISCECTOMY WITH FUSION C3-C7    Plan:     1. Continue PT/OT - Mobilize as tolerated, spine precautions. 2.  Continue established methods of pain control. 3.  VTE Prophylaxes - TEDS &/or SCDs, mobilize. 4.   Voiding continue Flomax. Continue Sandee colace and Miralax. PRN suppository. Post op constipation but no apparent ileus. 5.  Case management for discharge planning. Plan on home today if continues mobilize well and is safe for discharge. Follow up in 2 weeks for post op care.        Signed By: Ruben Coulter PA-C

## 2023-03-24 NOTE — PROGRESS NOTES
Spine Surgery Progress Note    Admit Date: 3/22/2023   LOS: 0 days      Daily Progress Note: 3/24/2023    POD:#2    S/P: Procedure(s):  ANTERIOR CERVICAL DISCECTOMY WITH FUSION C3-C7    Visit Vitals  BP (!) 150/71   Pulse (!) 53   Temp 98 °F (36.7 °C)   Resp 16   Ht 6' (1.829 m)   Wt 98.9 kg (218 lb)   SpO2 97%   BMI 29.57 kg/m²      Lab Results   Component Value Date/Time    HGB 13.2 03/15/2023 10:45 AM    INR 1.0 03/15/2023 10:45 AM       Patient doing well overall. No nausea or vomiting. No complaints. Abdominal distension has diminished. Voiding without issue. Pain well controlled on current medications. No complaints. Swallowing without issue. Denies nausea, vomiting, fever, chills, chest pain, dyspnea, headache. Calves soft/NTTP Bilaterally. DENNEY spontaneously and equally. 5/5  strength bilat  Neurocirculatory exam WNL. Motor and sensation intact. Incision OK; no drainage. Dressing clean and dry. Plan:  -Pain control - scheduled tylenol; PRN oxycodone, flexeril  -PT/OT; in soft collar  -D/c JANESSA drain 3/23  -ADAT  -Pericolace & miralax daily; KUB shows mild constipation  -Cont home meds as ordered  -Daily dressing changes to incision  -[facility]     [facility]  All questions were answered. Follow up in Dr. Salima Sosa office in 2 weeks, sooner if needed.     LAUREEN Sun

## 2023-03-24 NOTE — PROGRESS NOTES
Problem: Falls - Risk of  Goal: *Absence of Falls  Description: Document Elmo Britt Fall Risk and appropriate interventions in the flowsheet.   Outcome: Progressing Towards Goal  Note: Fall Risk Interventions:  Mobility Interventions: Patient to call before getting OOB       Medication Interventions: Patient to call before getting OOB, Teach patient to arise slowly    Elimination Interventions: Call light in reach, Urinal in reach

## 2023-03-27 ENCOUNTER — TELEPHONE (OUTPATIENT)
Dept: ORTHOPEDIC SURGERY | Age: 78
End: 2023-03-27

## 2023-03-27 NOTE — TELEPHONE ENCOUNTER
Reports  care delay for Home health Physical Therapy and nursing care. Anterior cervical diskectomy C3-4, C4-5, C5-6, C6-7; anterior interbody fusion C3-4, C4-5, C5-6, C6-7; anterior plate fixation C3, C4, C5, C6, and C7.

## 2023-04-04 ENCOUNTER — OFFICE VISIT (OUTPATIENT)
Dept: ORTHOPEDIC SURGERY | Age: 78
End: 2023-04-04

## 2023-04-04 RX ORDER — OXYCODONE HYDROCHLORIDE 5 MG/1
5 TABLET ORAL
Qty: 40 TABLET | Refills: 0 | Status: SHIPPED
Start: 2023-04-04 | End: 2023-04-18

## 2023-04-04 NOTE — LETTER
4/4/2023    Patient: Kip Chatman   YOB: 1945   Date of Visit: 4/4/2023     Francine Hutchinson MD  [address]  [id number] [place] [address],[phone]  Via Fax: [phone]    Dear Francine Hutchinson MD,      Thank you for referring Mr. Clive Bar to [facility] for evaluation. My notes for this consultation are attached. If you have questions, please do not hesitate to call me. I look forward to following your patient along with you.       Sincerely,    Yung Isaacs, PA

## 2023-04-04 NOTE — PROGRESS NOTES
Teena Schwartz (: 1945) is a 78 y.o. male patient here for evaluation of the following chief complaint(s):  No chief complaint on file. ASSESSMENT/PLAN:  Below is the assessment and plan developed based on review of pertinent history, physical exam, labs, studies, and medications. 1. S/P cervical spinal fusion  -     XR SPINE CERV PA LAT ODONT 3 V MAX; Future  -     oxyCODONE IR (ROXICODONE) 5 mg immediate release tablet; Take 1 Tablet by mouth every six (6) hours as needed for Pain for up to 14 days. Max Daily Amount: 20 mg. Indications: pain, Normal, Disp-40 Tablet, R-0Supervising Physician: Jessica Uribe MD NPI: [id number] RAFAELA: [id number]      Patient's radiologic findings have been reviewed with both he and his wife in detail today. He is doing exceptionally well at this point in his postoperative recovery. He will continue wearing his soft collar, and continue to limit his cervical motion and lifting. We will work to get him fitted for a bone stimulator. He will continue with oxycodone and will work to wean this medication as tolerated. He will also use Tylenol as needed. We will see him back for his next postoperative follow-up visit in 4 weeks. Return in about 4 weeks (around 2023) for Post op follow up, With Dr. Fran Calderon. SUBJECTIVE/OBJECTIVE:  Teena Schwartz (: 1945) is a 78 y.o. male who presents today for the following:  No chief complaint on file. HPI   Mr. Rodríguez's today for routine postoperative follow-up visit. He is approximately 2 weeks out from his recent cervical fusion. He is doing quite well. He has been tolerating his soft collar well, and denies incisional difficulties. He is using oxycodone 5 mg approximately 3 times per day and does request a refill of this. He has not been fitted for a bone stimulator thus far.     IMAGING:  XR Results (most recent):  Results from Appointment encounter on 23    XR SPINE CERV PA LAT ODONT 3 V MAX    Narrative  AP and lateral films of the cervical spine reveal stable cervical fusion from C3-C7 with stable instrumentation. MRI Results (most recent): Allergies   Allergen Reactions    Nsaids (Non-Steroidal Anti-Inflammatory Drug) Other (comments)     Constipation         Current Outpatient Medications   Medication Sig    oxyCODONE IR (ROXICODONE) 5 mg immediate release tablet Take 1 Tablet by mouth every six (6) hours as needed for Pain for up to 14 days. Max Daily Amount: 20 mg. Indications: pain    senna-docusate (PERICOLACE) 8.6-50 mg per tablet Take 1 Tablet by mouth two (2) times a day. Indications: opioid induced constipation    polyethylene glycol (MIRALAX) 17 gram packet Take 1 Packet by mouth two (2) times daily as needed for Constipation. Indications: constipation, opioid induced constipation    naloxone (Narcan) 4 mg/actuation nasal spray Use 1 spray intranasally, then discard. Repeat with new spray every 2 min as needed for opioid overdose symptoms, alternating nostrils. hydrALAZINE (APRESOLINE) 50 mg tablet Take 50 mg by mouth two (2) times a day. metoprolol succinate (TOPROL-XL) 50 mg XL tablet Take  by mouth daily. cholecalciferol, vitamin D3, 50 mcg (2,000 unit) tab Take  by mouth daily. tamsulosin (FLOMAX) 0.4 mg capsule Take 0.4 mg by mouth daily. calcium-cholecalciferol, d3, 600-125 mg-unit tab Take  by mouth.    multivitamin (ONE A DAY) tablet Take 1 Tab by mouth daily. Omeprazole delayed release (PRILOSEC D/R) 20 mg tablet Take 1 Tab by mouth daily. diltiazem CD (CARDIZEM CD) 240 mg ER capsule Take 1 Cap by mouth daily. pravastatin (PRAVACHOL) 40 mg tablet Take 1 Tab by mouth daily. (Patient taking differently: Take 40 mg by mouth nightly.)    sildenafil citrate (VIAGRA) 100 mg tablet Take 1 Tab by mouth as needed. traZODone (DESYREL) 50 mg tablet Take  by mouth nightly. aspirin 81 mg Tab Take  by mouth daily.      No current facility-administered medications for this visit. Past Medical History:   Diagnosis Date    Arthritis     BPH (benign prostatic hyperplasia)     Chronic kidney disease     Chronic pain     Dyspepsia and other specified disorders of function of stomach     Hypercholesterolemia     Hypertension     Ill-defined condition     enlarged prostate    Other ill-defined conditions(799.89)     high cholest.    Psychiatric disorder     PTSD    Sleep apnea     NOT ON CPAP        Past Surgical History:   Procedure Laterality Date    HX CATARACT REMOVAL  2006    HX HERNIA REPAIR  2813    UMBILICAL    HX ORTHOPAEDIC Left 2014    elbow surgery       Family History   Problem Relation Age of Onset    Cancer Mother     Cancer Sister         COLON    Anesth Problems Neg Hx         Social History     Tobacco Use    Smoking status: Former     Packs/day: 1.00     Years: 10.00     Pack years: 10.00     Types: Cigarettes     Quit date: 1995     Years since quittin.2    Smokeless tobacco: Never   Substance Use Topics    Alcohol use: Yes     Comment: rarely        Review of Systems   Constitutional: Negative. Respiratory: Negative. Cardiovascular: Negative. Gastrointestinal: Negative. Endocrine: Negative. Genitourinary: Negative. Musculoskeletal:  Positive for neck pain. Skin: Negative. Allergic/Immunologic: Negative. Hematological: Negative. Psychiatric/Behavioral: Negative. All other systems reviewed and are negative. No flowsheet data found. Vitals: There were no vitals taken for this visit. There is no height or weight on file to calculate BMI. Physical Exam    Integumentary  Assessment of Surgical Incision - healing and consistent with normal anticipated wound healing. Neurologic  Overall Assessment of Muscle Strength and Tone reveals  Upper Extremities - Right Deltoid - 5/5. Left Deltoid - 5/5. Right Bicep - 5/5. Left Bicep - 5/5. Right Tricep - 5/5.  Left Tricep - 5/5. Right Wrist Extensors - 5/5. Left Wrist Extensors - 5/5. Right Wrist Flexors - 5/5. Left Wrist Flexors - 5/5. Right Intrinsics - 5/5. Left Intrinsics - 5/5. General Assessment of Reflexes  Right Hand - Cr's sign is negative in the right hand. Left Hand - Cr's sign is negative in the left hand. Reflexes (Dermatomes)  2/2 Normal - Left Bicep (C5-6), Left Tricep (C7-8), Left Brachioradialis (C5-6), Right Bicep (C5-6), Right Tricep (C7-8) and Right Brachioradialis (C5-6). Musculoskeletal  Global Assessment  Examination of related systems reveals - well-developed, well-nourished, in no acute distress, alert and oriented x 3 and normal coordination. Gait and Station - normal gait and station and normal posture. Spine/Ribs/Pelvis  Cervical Spine - Evaluation of related systems reveals - no lymphadenopathy and neurovascularly intact bilaterally. Inspection and Palpation - Tenderness - moderate and localized. Assessment of pain reveals the following findings: - Location - cervical area. Dr. Patrice Cee was available for immediate consult during this encounter. An electronic signature was used to authenticate this note.   -- LAUREEN Medina

## 2023-04-23 ENCOUNTER — TRANSCRIBE ORDERS (OUTPATIENT)
Facility: HOSPITAL | Age: 78
End: 2023-04-23

## 2023-04-23 DIAGNOSIS — R97.20 ELEVATED PROSTATE SPECIFIC ANTIGEN (PSA): Primary | ICD-10-CM

## 2023-05-04 ENCOUNTER — OFFICE VISIT (OUTPATIENT)
Dept: ORTHOPEDIC SURGERY | Age: 78
End: 2023-05-04

## 2023-05-04 VITALS — BODY MASS INDEX: 29.53 KG/M2 | HEIGHT: 72 IN | WEIGHT: 218 LBS

## 2023-05-04 DIAGNOSIS — Z98.1 S/P CERVICAL SPINAL FUSION: Primary | ICD-10-CM

## 2023-05-09 ENCOUNTER — HOSPITAL ENCOUNTER (OUTPATIENT)
Facility: HOSPITAL | Age: 78
Discharge: HOME | End: 2023-05-12
Payer: MEDICARE

## 2023-05-09 DIAGNOSIS — R97.20 ELEVATED PROSTATE SPECIFIC ANTIGEN (PSA): ICD-10-CM

## 2023-05-09 PROCEDURE — 6360000004 HC RX CONTRAST MEDICATION: Performed by: INTERNAL MEDICINE

## 2023-05-09 PROCEDURE — A9579 GAD-BASE MR CONTRAST NOS,1ML: HCPCS | Performed by: INTERNAL MEDICINE

## 2023-05-09 PROCEDURE — 72197 MRI PELVIS W/O & W/DYE: CPT

## 2023-05-09 RX ADMIN — GADOTERIDOL 20 ML: 279.3 INJECTION, SOLUTION INTRAVENOUS at 11:36

## 2024-10-03 ENCOUNTER — TRANSCRIBE ORDERS (OUTPATIENT)
Facility: HOSPITAL | Age: 79
End: 2024-10-03

## 2024-10-03 DIAGNOSIS — M48.062 LUMBAR STENOSIS WITH NEUROGENIC CLAUDICATION: Primary | ICD-10-CM

## 2024-10-28 ENCOUNTER — HOSPITAL ENCOUNTER (OUTPATIENT)
Facility: HOSPITAL | Age: 79
Discharge: HOME | End: 2024-10-31
Payer: MEDICARE

## 2024-10-28 DIAGNOSIS — M48.062 LUMBAR STENOSIS WITH NEUROGENIC CLAUDICATION: ICD-10-CM

## 2024-10-28 PROCEDURE — 72148 MRI LUMBAR SPINE W/O DYE: CPT

## (undated) DEVICE — HALTER TRACTION HD W/ TRI COTTON LINING FOAM LTX

## (undated) DEVICE — COVER,MAYO STAND,STERILE: Brand: MEDLINE

## (undated) DEVICE — SOL INJ SOD CL 0.9% 250ML BG --

## (undated) DEVICE — BONE WAX WHITE: Brand: BONE WAX WHITE

## (undated) DEVICE — SYR LR LCK 1ML GRAD NSAF 30ML --

## (undated) DEVICE — TELFA NON-ADHERENT ABSORBENT DRESSING: Brand: TELFA

## (undated) DEVICE — SUTURE STRATAFIX SPRL MCRYL + SZ 3 0 L8IN ABSRB UD L26MM SH SXMP1B427

## (undated) DEVICE — TAPE,CLOTH/SILK,CURAD,3"X10YD,LF,40/CS: Brand: CURAD

## (undated) DEVICE — C-ARM: Brand: UNBRANDED

## (undated) DEVICE — GLOVE SURG SZ 8 L12IN FNGR THK79MIL GRN LTX FREE

## (undated) DEVICE — GLOVE SURG SZ 75 L12IN FNGR THK94MIL STD WHT LTX FREE

## (undated) DEVICE — DRAIN SURG FLAT W7MMXL20CM FULL PERF

## (undated) DEVICE — DRILL BIT 7080510 11 MM DRILL BIT S

## (undated) DEVICE — RESERVOIR,SUCTION,100CC,SILICONE: Brand: MEDLINE

## (undated) DEVICE — MASTISOL ADHESIVE LIQ 2/3ML

## (undated) DEVICE — PAD,NON-ADHERENT,3X8,STERILE,LF,1/PK: Brand: MEDLINE

## (undated) DEVICE — ANTERIOR CERVICAL-SMH: Brand: MEDLINE INDUSTRIES, INC.

## (undated) DEVICE — BIPOLAR IRRIGATOR INTEGRATED TUBING AND BIPOLAR CORD SET, DISPOSABLE

## (undated) DEVICE — SUTURE ABSRB L30CM 2-0 VLT SPRL PDS + STRATAFIX SXPP1B410

## (undated) DEVICE — TOOL 14MH30 LEGEND 14CM 3MM: Brand: MIDAS REX ™

## (undated) DEVICE — STRIP,CLOSURE,WOUND,MEDI-STRIP,1/2X4: Brand: MEDLINE